# Patient Record
Sex: FEMALE | NOT HISPANIC OR LATINO
[De-identification: names, ages, dates, MRNs, and addresses within clinical notes are randomized per-mention and may not be internally consistent; named-entity substitution may affect disease eponyms.]

---

## 2017-10-02 ENCOUNTER — CHARTING TRANS (OUTPATIENT)
Dept: OTHER | Age: 47
End: 2017-10-02

## 2017-11-16 ENCOUNTER — MYAURORA ACCOUNT LINK (OUTPATIENT)
Dept: OTHER | Age: 47
End: 2017-11-16

## 2017-11-16 ENCOUNTER — CHARTING TRANS (OUTPATIENT)
Dept: FAMILY MEDICINE | Age: 47
End: 2017-11-16

## 2017-12-28 ENCOUNTER — LAB SERVICES (OUTPATIENT)
Dept: OTHER | Age: 47
End: 2017-12-28

## 2017-12-28 LAB
ALBUMIN SERPL BCG-MCNC: 4.5 G/DL (ref 3.6–5.1)
ALP SERPL-CCNC: 77 U/L (ref 45–105)
ALT SERPL W/O P-5'-P-CCNC: 28 U/L (ref 15–43)
AST SERPL-CCNC: 28 U/L (ref 14–43)
BASOPHIL %: 1.3 % (ref 0–1.2)
BASOPHIL ABSOLUTE #: 0.1 10*3/UL (ref 0–0.1)
BILIRUB SERPL-MCNC: 0.5 MG/DL (ref 0–1.3)
BUN SERPL-MCNC: 12 MG/DL (ref 7–20)
CALCIUM SERPL-MCNC: 9.9 MG/DL (ref 8.6–10.6)
CHLORIDE SERPL-SCNC: 100 MMOL/L (ref 96–107)
CHOLEST SERPL-MCNC: 270 MG/DL (ref 140–200)
CREATININE, SERUM: 0.8 MG/DL (ref 0.5–1.4)
DIFFERENTIAL TYPE: ABNORMAL
EOSINOPHIL %: 3.7 % (ref 0–10)
EOSINOPHIL ABSOLUTE #: 0.3 10*3/UL (ref 0–0.5)
GFR SERPL CREATININE-BSD FRML MDRD: >60 ML/MIN/{1.73M2}
GFR SERPL CREATININE-BSD FRML MDRD: >60 ML/MIN/{1.73M2}
GLUCOSE P FAST SERPL-MCNC: 98 MG/DL (ref 60–100)
HCO3 SERPL-SCNC: 28 MMOL/L (ref 22–32)
HDLC SERPL-MCNC: 70 MG/DL
HEMATOCRIT: 43.9 % (ref 34–45)
HEMOGLOBIN: 14.1 G/DL (ref 11.2–15.7)
IGA SERPL-MCNC: 251 MG/DL (ref 70–400)
LDLC SERPL DIRECT ASSAY-MCNC: 161 MG/DL (ref 30–100)
LYMPH PERCENT: 35.8 % (ref 20.5–51.1)
LYMPHOCYTE ABSOLUTE #: 2.7 10*3/UL (ref 1.2–3.4)
MEAN CORPUSCULAR HGB CONCENTRATION: 32.1 % (ref 32–36)
MEAN CORPUSCULAR HGB: 31.5 PG (ref 27–34)
MEAN CORPUSCULAR VOLUME: 98 FL (ref 79–95)
MEAN PLATELET VOLUME: 10.9 FL (ref 8.6–12.4)
MONOCYTE ABSOLUTE #: 0.5 10*3/UL (ref 0.2–0.9)
MONOCYTE PERCENT: 7.2 % (ref 4.3–12.9)
NEUTROPHIL ABSOLUTE #: 3.9 10*3/UL (ref 1.4–6.5)
NEUTROPHIL PERCENT: 52 % (ref 34–73.5)
PLATELET COUNT: 244 10*3/UL (ref 150–400)
POTASSIUM SERPL-SCNC: 4 MMOL/L (ref 3.5–5.3)
PROT SERPL-MCNC: 7.4 G/DL (ref 6.4–8.5)
RED BLOOD CELL COUNT: 4.48 10*6/UL (ref 3.7–5.2)
RED CELL DISTRIBUTION WIDTH: 12.2 % (ref 11.3–14.8)
SODIUM SERPL-SCNC: 138 MMOL/L (ref 136–146)
TRIGL SERPL-MCNC: 260 MG/DL (ref 0–200)
TSH SERPL DL<=0.05 MIU/L-ACNC: 2.41 M[IU]/L (ref 0.3–4.82)
WHITE BLOOD CELL COUNT: 7.5 10*3/UL (ref 4–10)

## 2017-12-29 LAB — TTG IGA SER IA-ACNC: 0.6 U/ML (ref 0–7)

## 2018-01-02 LAB — ENDOMYSIUM IGA TITR SER IF: NORMAL {TITER}

## 2018-11-27 VITALS
HEART RATE: 64 BPM | BODY MASS INDEX: 24.96 KG/M2 | SYSTOLIC BLOOD PRESSURE: 110 MMHG | HEIGHT: 67 IN | TEMPERATURE: 98.5 F | WEIGHT: 159 LBS | RESPIRATION RATE: 16 BRPM | DIASTOLIC BLOOD PRESSURE: 70 MMHG

## 2021-09-08 ENCOUNTER — LAB ENCOUNTER (OUTPATIENT)
Dept: LAB | Age: 51
End: 2021-09-08
Attending: FAMILY MEDICINE
Payer: COMMERCIAL

## 2021-09-08 ENCOUNTER — OFFICE VISIT (OUTPATIENT)
Dept: FAMILY MEDICINE CLINIC | Facility: CLINIC | Age: 51
End: 2021-09-08
Payer: COMMERCIAL

## 2021-09-08 VITALS
HEART RATE: 76 BPM | OXYGEN SATURATION: 98 % | SYSTOLIC BLOOD PRESSURE: 108 MMHG | WEIGHT: 148.5 LBS | RESPIRATION RATE: 18 BRPM | DIASTOLIC BLOOD PRESSURE: 68 MMHG | BODY MASS INDEX: 23.31 KG/M2 | HEIGHT: 67 IN | TEMPERATURE: 99 F

## 2021-09-08 DIAGNOSIS — Z00.00 GENERAL MEDICAL EXAM: ICD-10-CM

## 2021-09-08 DIAGNOSIS — Z83.3 FAMILY HISTORY OF DIABETES MELLITUS: ICD-10-CM

## 2021-09-08 DIAGNOSIS — J01.00 SUBACUTE MAXILLARY SINUSITIS: ICD-10-CM

## 2021-09-08 DIAGNOSIS — Z12.31 ENCOUNTER FOR SCREENING MAMMOGRAM FOR MALIGNANT NEOPLASM OF BREAST: ICD-10-CM

## 2021-09-08 DIAGNOSIS — J30.89 SEASONAL ALLERGIC RHINITIS DUE TO OTHER ALLERGIC TRIGGER: ICD-10-CM

## 2021-09-08 DIAGNOSIS — Z12.11 SCREEN FOR COLON CANCER: ICD-10-CM

## 2021-09-08 DIAGNOSIS — Z00.00 GENERAL MEDICAL EXAM: Primary | ICD-10-CM

## 2021-09-08 DIAGNOSIS — K20.0 EOSINOPHILIC ESOPHAGITIS: ICD-10-CM

## 2021-09-08 PROBLEM — J30.9 ALLERGIC RHINITIS DUE TO ALLERGEN: Status: ACTIVE | Noted: 2021-09-08

## 2021-09-08 PROBLEM — B97.7 HPV (HUMAN PAPILLOMA VIRUS) INFECTION: Status: ACTIVE | Noted: 2021-09-08

## 2021-09-08 LAB
ALBUMIN SERPL-MCNC: 3.7 G/DL (ref 3.4–5)
ALBUMIN/GLOB SERPL: 1.1 {RATIO} (ref 1–2)
ALP LIVER SERPL-CCNC: 66 U/L
ALT SERPL-CCNC: 23 U/L
ANION GAP SERPL CALC-SCNC: 3 MMOL/L (ref 0–18)
AST SERPL-CCNC: 23 U/L (ref 15–37)
BASOPHILS # BLD AUTO: 0.1 X10(3) UL (ref 0–0.2)
BASOPHILS NFR BLD AUTO: 1.7 %
BILIRUB SERPL-MCNC: 0.4 MG/DL (ref 0.1–2)
BUN BLD-MCNC: 15 MG/DL (ref 7–18)
CALCIUM BLD-MCNC: 9.4 MG/DL (ref 8.5–10.1)
CHLORIDE SERPL-SCNC: 108 MMOL/L (ref 98–112)
CHOLEST SMN-MCNC: 225 MG/DL (ref ?–200)
CO2 SERPL-SCNC: 29 MMOL/L (ref 21–32)
CREAT BLD-MCNC: 0.82 MG/DL
EOSINOPHIL # BLD AUTO: 0.1 X10(3) UL (ref 0–0.7)
EOSINOPHIL NFR BLD AUTO: 1.7 %
ERYTHROCYTE [DISTWIDTH] IN BLOOD BY AUTOMATED COUNT: 12 %
EST. AVERAGE GLUCOSE BLD GHB EST-MCNC: 114 MG/DL (ref 68–126)
GLOBULIN PLAS-MCNC: 3.5 G/DL (ref 2.8–4.4)
GLUCOSE BLD-MCNC: 93 MG/DL (ref 70–99)
HBA1C MFR BLD HPLC: 5.6 % (ref ?–5.7)
HCT VFR BLD AUTO: 43.4 %
HDLC SERPL-MCNC: 60 MG/DL (ref 40–59)
HGB BLD-MCNC: 13.7 G/DL
IMM GRANULOCYTES # BLD AUTO: 0.01 X10(3) UL (ref 0–1)
IMM GRANULOCYTES NFR BLD: 0.2 %
LDLC SERPL CALC-MCNC: 137 MG/DL (ref ?–100)
LYMPHOCYTES # BLD AUTO: 1.96 X10(3) UL (ref 1–4)
LYMPHOCYTES NFR BLD AUTO: 34 %
M PROTEIN MFR SERPL ELPH: 7.2 G/DL (ref 6.4–8.2)
MCH RBC QN AUTO: 30.9 PG (ref 26–34)
MCHC RBC AUTO-ENTMCNC: 31.6 G/DL (ref 31–37)
MCV RBC AUTO: 98 FL
MONOCYTES # BLD AUTO: 0.35 X10(3) UL (ref 0.1–1)
MONOCYTES NFR BLD AUTO: 6.1 %
NEUTROPHILS # BLD AUTO: 3.24 X10 (3) UL (ref 1.5–7.7)
NEUTROPHILS # BLD AUTO: 3.24 X10(3) UL (ref 1.5–7.7)
NEUTROPHILS NFR BLD AUTO: 56.3 %
NONHDLC SERPL-MCNC: 165 MG/DL (ref ?–130)
OSMOLALITY SERPL CALC.SUM OF ELEC: 291 MOSM/KG (ref 275–295)
PATIENT FASTING Y/N/NP: NO
PATIENT FASTING Y/N/NP: NO
PLATELET # BLD AUTO: 212 10(3)UL (ref 150–450)
POTASSIUM SERPL-SCNC: 4.7 MMOL/L (ref 3.5–5.1)
RBC # BLD AUTO: 4.43 X10(6)UL
SODIUM SERPL-SCNC: 140 MMOL/L (ref 136–145)
T4 FREE SERPL-MCNC: 0.8 NG/DL (ref 0.8–1.7)
TRIGL SERPL-MCNC: 158 MG/DL (ref 30–149)
TSI SER-ACNC: 2.06 MIU/ML (ref 0.36–3.74)
VLDLC SERPL CALC-MCNC: 29 MG/DL (ref 0–30)
WBC # BLD AUTO: 5.8 X10(3) UL (ref 4–11)

## 2021-09-08 PROCEDURE — 3078F DIAST BP <80 MM HG: CPT | Performed by: NURSE PRACTITIONER

## 2021-09-08 PROCEDURE — 99386 PREV VISIT NEW AGE 40-64: CPT | Performed by: NURSE PRACTITIONER

## 2021-09-08 PROCEDURE — 83036 HEMOGLOBIN GLYCOSYLATED A1C: CPT | Performed by: NURSE PRACTITIONER

## 2021-09-08 PROCEDURE — 80050 GENERAL HEALTH PANEL: CPT | Performed by: NURSE PRACTITIONER

## 2021-09-08 PROCEDURE — 84439 ASSAY OF FREE THYROXINE: CPT | Performed by: NURSE PRACTITIONER

## 2021-09-08 PROCEDURE — 3074F SYST BP LT 130 MM HG: CPT | Performed by: NURSE PRACTITIONER

## 2021-09-08 PROCEDURE — 3008F BODY MASS INDEX DOCD: CPT | Performed by: NURSE PRACTITIONER

## 2021-09-08 PROCEDURE — 80061 LIPID PANEL: CPT | Performed by: NURSE PRACTITIONER

## 2021-09-08 RX ORDER — AMOXICILLIN AND CLAVULANATE POTASSIUM 875; 125 MG/1; MG/1
1 TABLET, FILM COATED ORAL 2 TIMES DAILY
Qty: 20 TABLET | Refills: 0 | Status: SHIPPED | OUTPATIENT
Start: 2021-09-08 | End: 2021-09-18

## 2021-09-08 RX ORDER — NORGESTIMATE AND ETHINYL ESTRADIOL 0.25-0.035
KIT ORAL DAILY
COMMUNITY
End: 2021-11-22

## 2021-09-08 NOTE — PROGRESS NOTES
HPI: HPI   Patient is here to establish care and for regular check up. Feels she may have a sinus infection. Dealt with an infected tooth in the spring. Had to have root canal and there was a lot of pus drainage. On abx, most recently in June 2021.  Fee did elimination diet   Endocrine: Negative for cold intolerance, heat intolerance, polydipsia, polyphagia and polyuria. Thin hair   Genitourinary: Negative for dysuria, frequency, hematuria and pelvic pain. Musculoskeletal: Negative for back pain. Musculoskeletal:         General: Normal range of motion. Cervical back: Normal range of motion. Skin:     General: Skin is warm and dry. Neurological:      General: No focal deficit present.       Mental Status: She is alert and oriented to pers

## 2021-09-08 NOTE — PATIENT INSTRUCTIONS
What is the human papillomavirus (HPV) vaccine? The HPV vaccine helps keep people from getting infected with a germ called \"human papillomavirus,\" or \"HPV. \"    Vaccines can prevent certain serious or deadly infections.  They work by preparing the body age 6 or 15. But people can get the vaccine any time from age 5 to 32. People should not get the vaccine if they are pregnant. The HPV vaccine works best when it is given before a person gets infected with HPV.  The HPV vaccine can't cure an HPV infecti They also look for cells that could turn into cancer, called \"precancer. \"    Getting the HPV vaccine lowers your chances of getting cervical cancer. But it does not completely protect you. You should still be screened for cancer or precancer.

## 2021-09-17 ENCOUNTER — TELEPHONE (OUTPATIENT)
Dept: FAMILY MEDICINE CLINIC | Facility: CLINIC | Age: 51
End: 2021-09-17

## 2021-09-17 NOTE — TELEPHONE ENCOUNTER
Patient was seen in the respiratory clinic on 9/8/21. She states her sinus symptoms have sense resolved. She was wanting to know if she should still take the antibiotic.  Advised against taking the medication if her symptoms have resolved and she verbalized

## 2021-09-17 NOTE — TELEPHONE ENCOUNTER
Blane Jozef is calling because she waited to take the antibiotic due to issues with her tooth but now some of the issues that she had been having have gone away so she is wondering if she should take them.   Please call  Please leave a message if she doesn't answe

## 2021-10-12 ENCOUNTER — HOSPITAL ENCOUNTER (OUTPATIENT)
Dept: MAMMOGRAPHY | Age: 51
Discharge: HOME | End: 2021-10-12
Attending: NURSE PRACTITIONER
Payer: COMMERCIAL

## 2021-10-12 DIAGNOSIS — Z12.31 ENCOUNTER FOR SCREENING MAMMOGRAM FOR MALIGNANT NEOPLASM OF BREAST: ICD-10-CM

## 2021-10-12 PROCEDURE — 77067 SCR MAMMO BI INCL CAD: CPT | Performed by: NURSE PRACTITIONER

## 2021-11-22 ENCOUNTER — TELEPHONE (OUTPATIENT)
Dept: FAMILY MEDICINE CLINIC | Facility: CLINIC | Age: 51
End: 2021-11-22

## 2021-11-22 RX ORDER — AMOXICILLIN AND CLAVULANATE POTASSIUM 875; 125 MG/1; MG/1
1 TABLET, FILM COATED ORAL EVERY 12 HOURS
COMMUNITY
Start: 2021-11-12 | End: 2022-01-25 | Stop reason: ALTCHOICE

## 2021-11-22 RX ORDER — FLUCONAZOLE 150 MG/1
150 TABLET ORAL ONCE
Qty: 2 TABLET | Refills: 0 | Status: SHIPPED | OUTPATIENT
Start: 2021-11-22 | End: 2021-11-22

## 2021-11-22 RX ORDER — NORGESTIMATE AND ETHINYL ESTRADIOL
1 KIT DAILY
COMMUNITY
Start: 2021-11-12

## 2021-11-22 NOTE — TELEPHONE ENCOUNTER
HAS YEAST INF FROM [facility] PRESCRIBED BY DENTIST, CAN [facility] BE SENT TO [facility] FOR HER?  THIS SEEMS TO WORK BEST FOR HER YEAST INF, LEAVE DETAILED VOICEMAIL IF SHE DOES NOT ANSWER

## 2021-11-22 NOTE — TELEPHONE ENCOUNTER
Pt has never seen Dr. Joselyn Goodell. Has only seen Keli once on 9/8/21 (and at Dr. Jadon [facility])    States she was prescribed Augmentin 875mg BID on 11/12/21 by her dentist, and has 3 days left. C/o vaginal itching since yesterday. No discharge yet.  States she

## 2021-12-14 ENCOUNTER — TELEPHONE (OUTPATIENT)
Dept: GENERAL RADIOLOGY | Facility: HOSPITAL | Age: 51
End: 2021-12-14

## 2021-12-14 ENCOUNTER — HOSPITAL ENCOUNTER (OUTPATIENT)
Dept: MAMMOGRAPHY | Age: 51
Discharge: HOME | End: 2021-12-14
Attending: NURSE PRACTITIONER
Payer: COMMERCIAL

## 2021-12-14 DIAGNOSIS — R92.2 INCONCLUSIVE MAMMOGRAM: ICD-10-CM

## 2021-12-14 PROCEDURE — 77061 BREAST TOMOSYNTHESIS UNI: CPT | Performed by: NURSE PRACTITIONER

## 2021-12-14 PROCEDURE — 77065 DX MAMMO INCL CAD UNI: CPT | Performed by: NURSE PRACTITIONER

## 2021-12-15 NOTE — IMAGING NOTE
Carrie Zepeda is recommended for a stereotactic biopsy of the right breast by . 1416: Spoke with Ms. Ruvalcaba at this time.      History: Inconclusive mammogram     FINDINGS: Rommie Ruff are grouped microcalcifications in the right breast slightly

## 2022-01-04 ENCOUNTER — TELEPHONE (OUTPATIENT)
Dept: FAMILY MEDICINE CLINIC | Facility: CLINIC | Age: 52
End: 2022-01-04

## 2022-01-24 ENCOUNTER — TELEPHONE (OUTPATIENT)
Dept: FAMILY MEDICINE CLINIC | Facility: CLINIC | Age: 52
End: 2022-01-24

## 2022-01-24 NOTE — TELEPHONE ENCOUNTER
Spoke with pt. States she thinks she has shingles. Reports she didn't feel well in general on Thursday and Friday. On Saturday, she had pain and itching on her L ribcage, \"like fireants\".  She then developed small patch of bumps in the front of ribs and

## 2022-01-24 NOTE — TELEPHONE ENCOUNTER
Please advise patient that she may want to schedule an office visit to determine whether these are shingles or some other reason for the rash as I have never seen her as a patient.   It should be okay to proceed with the breast biopsy as the area of concern

## 2022-01-24 NOTE — TELEPHONE ENCOUNTER
SHE THINKS THAT SHE HAS SHINGLES BUT IS SCHEDULED FOR A PROCEDURE THIS Wednesday. CAN SHE STILL HAVE THAT DONE?

## 2022-01-25 ENCOUNTER — OFFICE VISIT (OUTPATIENT)
Dept: FAMILY MEDICINE CLINIC | Facility: CLINIC | Age: 52
End: 2022-01-25
Payer: COMMERCIAL

## 2022-01-25 VITALS
WEIGHT: 150 LBS | HEART RATE: 103 BPM | SYSTOLIC BLOOD PRESSURE: 134 MMHG | BODY MASS INDEX: 23.54 KG/M2 | RESPIRATION RATE: 16 BRPM | HEIGHT: 67 IN | DIASTOLIC BLOOD PRESSURE: 70 MMHG | TEMPERATURE: 99 F | OXYGEN SATURATION: 98 %

## 2022-01-25 DIAGNOSIS — B02.9 HERPES ZOSTER WITHOUT COMPLICATION: Primary | ICD-10-CM

## 2022-01-25 DIAGNOSIS — R92.0 MICROCALCIFICATION OF RIGHT BREAST ON MAMMOGRAPHY: ICD-10-CM

## 2022-01-25 PROCEDURE — 3008F BODY MASS INDEX DOCD: CPT | Performed by: FAMILY MEDICINE

## 2022-01-25 PROCEDURE — 3075F SYST BP GE 130 - 139MM HG: CPT | Performed by: FAMILY MEDICINE

## 2022-01-25 PROCEDURE — 3078F DIAST BP <80 MM HG: CPT | Performed by: FAMILY MEDICINE

## 2022-01-25 PROCEDURE — 99213 OFFICE O/P EST LOW 20 MIN: CPT | Performed by: FAMILY MEDICINE

## 2022-01-25 RX ORDER — VALACYCLOVIR HYDROCHLORIDE 1 G/1
1000 TABLET, FILM COATED ORAL 3 TIMES DAILY
Qty: 21 TABLET | Refills: 0 | Status: SHIPPED | OUTPATIENT
Start: 2022-01-25 | End: 2022-02-01

## 2022-01-25 NOTE — IMAGING NOTE
Patient called to reschedule her breast biopsy stating that she has been diagnosed with shingles on her back and rib area.  Radiologist was willing to do biopsy as long as the shingles was not on her breast but patient decided to reschedule to 2-10-22 since

## 2022-01-25 NOTE — PATIENT INSTRUCTIONS
DISCUSSED ETIOLOGY, EXPECTED COURSE, DURATION, RESOLUTION AND MANAGEMENT OPTIONS OF SHINGLES. DISCUSSED POTENTIAL FOR POST HERPETIC NEURALGIA IN SUSCEPTIBLE PATIENTS. DISCUSSED INFECTION CONTROL AND TRANSMISSION OF VARICELLA.   Start Valtrex 1000 mg TID x

## 2022-01-25 NOTE — PROGRESS NOTES
Kelsey Amos is a 47year old female. Patient presents with:  Rash: left side under bra line x 4 days, very painful      HPI:   Rash started 1/22, felt run down, pain/tingling prior to rash, less pain today, no meds taken  Pt states she is not feeling go murmur, peripheral pulses intact  PSYCH: normal affect    ASSESSMENT AND PLAN:     Herpes zoster without complication  (primary encounter diagnosis)  Microcalcification of right breast on mammography  No orders of the defined types were placed in this enco

## 2022-01-26 ENCOUNTER — HOSPITAL ENCOUNTER (OUTPATIENT)
Dept: MAMMOGRAPHY | Facility: HOSPITAL | Age: 52
Discharge: HOME | End: 2022-01-26
Attending: NURSE PRACTITIONER
Payer: COMMERCIAL

## 2022-02-10 ENCOUNTER — HOSPITAL ENCOUNTER (OUTPATIENT)
Dept: MAMMOGRAPHY | Facility: HOSPITAL | Age: 52
Discharge: HOME | End: 2022-02-10
Attending: NURSE PRACTITIONER
Payer: COMMERCIAL

## 2022-02-10 DIAGNOSIS — R92.1 BREAST CALCIFICATIONS: ICD-10-CM

## 2022-02-10 PROCEDURE — 19081 BX BREAST 1ST LESION STRTCTC: CPT | Performed by: NURSE PRACTITIONER

## 2022-02-10 PROCEDURE — 88305 TISSUE EXAM BY PATHOLOGIST: CPT | Performed by: NURSE PRACTITIONER

## 2022-02-15 ENCOUNTER — TELEPHONE (OUTPATIENT)
Dept: MAMMOGRAPHY | Facility: HOSPITAL | Age: 52
End: 2022-02-15

## 2022-02-15 NOTE — TELEPHONE ENCOUNTER
Telephoned Alisia Rajan and name,  verified with patient. Notified Alisia Rajan of right breast negative for malignancy biopsy result. Concordance verified by radiologist, Dr. Carolann Martin. Alisia Cobia reports biopsy site is healing well. Radiologist recommends  Annual screening mammogram.  Pt verbalized understanding and had no further questions at this time.

## 2022-02-21 ENCOUNTER — TELEPHONE (OUTPATIENT)
Dept: FAMILY MEDICINE CLINIC | Facility: CLINIC | Age: 52
End: 2022-02-21

## 2022-02-21 NOTE — TELEPHONE ENCOUNTER
Pt had shingles on 1/25/22. She asks how long she needs to wait before she can get a shingles vaccine?

## 2022-02-25 ENCOUNTER — TELEPHONE (OUTPATIENT)
Dept: FAMILY MEDICINE CLINIC | Facility: CLINIC | Age: 52
End: 2022-02-25

## 2022-02-25 NOTE — TELEPHONE ENCOUNTER
[address] [place] [address] request for pathology report from breast biopsy on 2/10/22. Faxed on 2/25/22.

## 2022-03-30 ENCOUNTER — OFFICE VISIT (OUTPATIENT)
Dept: FAMILY MEDICINE CLINIC | Facility: CLINIC | Age: 52
End: 2022-03-30
Payer: COMMERCIAL

## 2022-03-30 VITALS
SYSTOLIC BLOOD PRESSURE: 104 MMHG | HEART RATE: 98 BPM | BODY MASS INDEX: 23 KG/M2 | DIASTOLIC BLOOD PRESSURE: 70 MMHG | TEMPERATURE: 99 F | OXYGEN SATURATION: 99 % | WEIGHT: 146 LBS

## 2022-03-30 DIAGNOSIS — Z23 NEED FOR VACCINATION: ICD-10-CM

## 2022-03-30 DIAGNOSIS — H69.83 DYSFUNCTION OF BOTH EUSTACHIAN TUBES: ICD-10-CM

## 2022-03-30 DIAGNOSIS — J01.41 ACUTE RECURRENT PANSINUSITIS: Primary | ICD-10-CM

## 2022-03-30 PROCEDURE — 3078F DIAST BP <80 MM HG: CPT | Performed by: FAMILY MEDICINE

## 2022-03-30 PROCEDURE — 99213 OFFICE O/P EST LOW 20 MIN: CPT | Performed by: FAMILY MEDICINE

## 2022-03-30 PROCEDURE — 3074F SYST BP LT 130 MM HG: CPT | Performed by: FAMILY MEDICINE

## 2022-03-30 RX ORDER — FLUCONAZOLE 150 MG/1
150 TABLET ORAL ONCE
Qty: 3 TABLET | Refills: 0 | Status: SHIPPED | OUTPATIENT
Start: 2022-03-30 | End: 2022-03-30

## 2022-03-30 RX ORDER — AMOXICILLIN AND CLAVULANATE POTASSIUM 875; 125 MG/1; MG/1
1 TABLET, FILM COATED ORAL 2 TIMES DAILY
Qty: 20 TABLET | Refills: 0 | Status: SHIPPED | OUTPATIENT
Start: 2022-03-30 | End: 2022-04-09

## 2022-03-30 NOTE — PATIENT INSTRUCTIONS
Continue nasal saline ad shahid. , proper intranasal administration reviewed  Add Flonase or Nasacort 2 sprays per nostril daily  Bacitracin antibiotic ointment to the nasal septum 2-3 times daily for the next 2 weeks  Augmentin 875 mg twice daily x10 days taken with food, potential GI side effects discussed  Patient may use fluconazole 150 mg daily at earliest onset of a vaginal yeast infection.

## 2022-05-20 ENCOUNTER — LAB ENCOUNTER (OUTPATIENT)
Dept: LAB | Age: 52
End: 2022-05-20
Attending: FAMILY MEDICINE
Payer: COMMERCIAL

## 2022-05-20 ENCOUNTER — HOSPITAL ENCOUNTER (OUTPATIENT)
Dept: GENERAL RADIOLOGY | Age: 52
Discharge: HOME | End: 2022-05-20
Attending: NURSE PRACTITIONER
Payer: COMMERCIAL

## 2022-05-20 ENCOUNTER — OFFICE VISIT (OUTPATIENT)
Dept: FAMILY MEDICINE CLINIC | Facility: CLINIC | Age: 52
End: 2022-05-20
Payer: COMMERCIAL

## 2022-05-20 VITALS
HEART RATE: 86 BPM | OXYGEN SATURATION: 100 % | TEMPERATURE: 99 F | BODY MASS INDEX: 22.29 KG/M2 | HEIGHT: 67 IN | SYSTOLIC BLOOD PRESSURE: 102 MMHG | RESPIRATION RATE: 18 BRPM | DIASTOLIC BLOOD PRESSURE: 72 MMHG | WEIGHT: 142 LBS

## 2022-05-20 DIAGNOSIS — Z83.3 FAMILY HISTORY OF DIABETES MELLITUS: ICD-10-CM

## 2022-05-20 DIAGNOSIS — E78.00 ELEVATED CHOLESTEROL: ICD-10-CM

## 2022-05-20 DIAGNOSIS — M47.22 OSTEOARTHRITIS OF SPINE WITH RADICULOPATHY, CERVICAL REGION: ICD-10-CM

## 2022-05-20 DIAGNOSIS — R20.0 NUMBNESS AND TINGLING OF RIGHT HAND: ICD-10-CM

## 2022-05-20 DIAGNOSIS — R20.2 NUMBNESS AND TINGLING OF RIGHT HAND: ICD-10-CM

## 2022-05-20 DIAGNOSIS — R53.83 OTHER FATIGUE: ICD-10-CM

## 2022-05-20 DIAGNOSIS — S00.06XA TICK BITE OF SCALP, INITIAL ENCOUNTER: ICD-10-CM

## 2022-05-20 DIAGNOSIS — M54.2 NECK PAIN: ICD-10-CM
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
## 2022-05-20 DIAGNOSIS — M25.60 JOINT STIFFNESS: Primary | ICD-10-CM

## 2022-05-20 DIAGNOSIS — M25.60 JOINT STIFFNESS: ICD-10-CM

## 2022-05-20 DIAGNOSIS — R52 BODY ACHES: ICD-10-CM

## 2022-05-20 DIAGNOSIS — R20.0 NUMBNESS AND TINGLING OF RIGHT HAND: Primary | ICD-10-CM

## 2022-05-20 DIAGNOSIS — R20.2 NUMBNESS AND TINGLING OF RIGHT HAND: Primary | ICD-10-CM

## 2022-05-20 LAB
BASOPHILS # BLD AUTO: 0.12 X10(3) UL (ref 0–0.2)
BASOPHILS NFR BLD AUTO: 1.8 %
CHOLEST SERPL-MCNC: 251 MG/DL (ref ?–200)
CRP SERPL-MCNC: <0.29 MG/DL (ref ?–0.3)
EOSINOPHIL # BLD AUTO: 1.02 X10(3) UL (ref 0–0.7)
EOSINOPHIL NFR BLD AUTO: 15.6 %
ERYTHROCYTE [DISTWIDTH] IN BLOOD BY AUTOMATED COUNT: 12.1 %
ERYTHROCYTE [SEDIMENTATION RATE] IN BLOOD: 8 MM/HR
EST. AVERAGE GLUCOSE BLD GHB EST-MCNC: 114 MG/DL (ref 68–126)
HBA1C MFR BLD: 5.6 % (ref ?–5.7)
HCT VFR BLD AUTO: 43.1 %
HDLC SERPL-MCNC: 70 MG/DL (ref 40–59)
HGB BLD-MCNC: 13.9 G/DL
IMM GRANULOCYTES # BLD AUTO: 0.01 X10(3) UL (ref 0–1)
IMM GRANULOCYTES NFR BLD: 0.2 %
LDLC SERPL CALC-MCNC: 157 MG/DL (ref ?–100)
LYMPHOCYTES # BLD AUTO: 2.9 X10(3) UL (ref 1–4)
LYMPHOCYTES NFR BLD AUTO: 44.3 %
MCH RBC QN AUTO: 31.5 PG (ref 26–34)
MCHC RBC AUTO-ENTMCNC: 32.3 G/DL (ref 31–37)
MCV RBC AUTO: 97.7 FL
MONOCYTES # BLD AUTO: 0.43 X10(3) UL (ref 0.1–1)
MONOCYTES NFR BLD AUTO: 6.6 %
NEUTROPHILS # BLD AUTO: 2.06 X10 (3) UL (ref 1.5–7.7)
NEUTROPHILS # BLD AUTO: 2.06 X10(3) UL (ref 1.5–7.7)
NEUTROPHILS NFR BLD AUTO: 31.5 %
NONHDLC SERPL-MCNC: 181 MG/DL (ref ?–130)
PLATELET # BLD AUTO: 232 10(3)UL (ref 150–450)
RBC # BLD AUTO: 4.41 X10(6)UL
RHEUMATOID FACT SERPL-ACNC: <10 IU/ML (ref ?–15)
T4 FREE SERPL-MCNC: 0.8 NG/DL (ref 0.8–1.7)
TRIGL SERPL-MCNC: 134 MG/DL (ref 30–149)
TSI SER-ACNC: 2.32 MIU/ML (ref 0.36–3.74)
VIT B12 SERPL-MCNC: 645 PG/ML (ref 193–986)
VIT D+METAB SERPL-MCNC: 35 NG/ML (ref 30–100)
VLDLC SERPL CALC-MCNC: 26 MG/DL (ref 0–30)
WBC # BLD AUTO: 6.5 X10(3) UL (ref 4–11)

## 2022-05-20 PROCEDURE — 86140 C-REACTIVE PROTEIN: CPT | Performed by: NURSE PRACTITIONER

## 2022-05-20 PROCEDURE — 87476 LYME DIS DNA AMP PROBE: CPT | Performed by: NURSE PRACTITIONER

## 2022-05-20 PROCEDURE — 99214 OFFICE O/P EST MOD 30 MIN: CPT | Performed by: NURSE PRACTITIONER

## 2022-05-20 PROCEDURE — 72050 X-RAY EXAM NECK SPINE 4/5VWS: CPT | Performed by: NURSE PRACTITIONER

## 2022-05-20 PROCEDURE — 86431 RHEUMATOID FACTOR QUANT: CPT | Performed by: NURSE PRACTITIONER

## 2022-05-20 PROCEDURE — 84439 ASSAY OF FREE THYROXINE: CPT | Performed by: NURSE PRACTITIONER

## 2022-05-20 PROCEDURE — 3074F SYST BP LT 130 MM HG: CPT | Performed by: NURSE PRACTITIONER

## 2022-05-20 PROCEDURE — 82306 VITAMIN D 25 HYDROXY: CPT | Performed by: NURSE PRACTITIONER

## 2022-05-20 PROCEDURE — 82607 VITAMIN B-12: CPT | Performed by: NURSE PRACTITIONER

## 2022-05-20 PROCEDURE — 85025 COMPLETE CBC W/AUTO DIFF WBC: CPT | Performed by: NURSE PRACTITIONER

## 2022-05-20 PROCEDURE — 85652 RBC SED RATE AUTOMATED: CPT | Performed by: NURSE PRACTITIONER

## 2022-05-20 PROCEDURE — 3008F BODY MASS INDEX DOCD: CPT | Performed by: NURSE PRACTITIONER

## 2022-05-20 PROCEDURE — 86038 ANTINUCLEAR ANTIBODIES: CPT | Performed by: NURSE PRACTITIONER

## 2022-05-20 PROCEDURE — 83036 HEMOGLOBIN GLYCOSYLATED A1C: CPT | Performed by: NURSE PRACTITIONER

## 2022-05-20 PROCEDURE — 80061 LIPID PANEL: CPT | Performed by: NURSE PRACTITIONER

## 2022-05-20 PROCEDURE — 84443 ASSAY THYROID STIM HORMONE: CPT | Performed by: NURSE PRACTITIONER

## 2022-05-20 PROCEDURE — 3078F DIAST BP <80 MM HG: CPT | Performed by: NURSE PRACTITIONER

## 2022-05-23 LAB — ANA SER QL: NEGATIVE

## 2022-05-25 DIAGNOSIS — E78.00 ELEVATED CHOLESTEROL: Primary | ICD-10-CM

## 2022-05-25 DIAGNOSIS — Z23 NEED FOR SHINGLES VACCINE: ICD-10-CM

## 2022-05-26 DIAGNOSIS — M54.2 NECK PAIN: ICD-10-CM

## 2022-05-26 DIAGNOSIS — R20.2 NUMBNESS AND TINGLING OF RIGHT HAND: Primary | ICD-10-CM

## 2022-05-26 DIAGNOSIS — R20.0 NUMBNESS AND TINGLING OF RIGHT HAND: Primary | ICD-10-CM

## 2022-05-26 LAB — BORRELIA SPECIES DNA DETECTION: NOT DETECTED

## 2022-05-26 RX ORDER — CYCLOBENZAPRINE HCL 10 MG
10 TABLET ORAL 3 TIMES DAILY PRN
Qty: 20 TABLET | Refills: 0 | Status: SHIPPED | OUTPATIENT
Start: 2022-05-26

## 2022-05-26 RX ORDER — PREDNISONE 20 MG/1
20 TABLET ORAL 2 TIMES DAILY
Qty: 10 TABLET | Refills: 0 | Status: SHIPPED | OUTPATIENT
Start: 2022-05-26 | End: 2022-05-31

## 2022-06-01 DIAGNOSIS — R20.2 NUMBNESS AND TINGLING OF HAND: Primary | ICD-10-CM

## 2022-06-01 DIAGNOSIS — R20.0 NUMBNESS AND TINGLING OF HAND: Primary | ICD-10-CM

## 2022-06-10 ENCOUNTER — TELEPHONE (OUTPATIENT)
Dept: PHYSICAL THERAPY | Facility: HOSPITAL | Age: 52
End: 2022-06-10

## 2022-06-13 ENCOUNTER — OFFICE VISIT (OUTPATIENT)
Dept: PHYSICAL THERAPY | Age: 52
End: 2022-06-13
Attending: NURSE PRACTITIONER
Payer: COMMERCIAL

## 2022-06-13 DIAGNOSIS — R20.0 NUMBNESS AND TINGLING OF RIGHT HAND: ICD-10-CM

## 2022-06-13 DIAGNOSIS — R20.2 NUMBNESS AND TINGLING OF RIGHT HAND: ICD-10-CM

## 2022-06-13 DIAGNOSIS — M47.22 OSTEOARTHRITIS OF SPINE WITH RADICULOPATHY, CERVICAL REGION: ICD-10-CM

## 2022-06-13 PROCEDURE — 97110 THERAPEUTIC EXERCISES: CPT

## 2022-06-13 PROCEDURE — 97162 PT EVAL MOD COMPLEX 30 MIN: CPT

## 2022-06-16 ENCOUNTER — OFFICE VISIT (OUTPATIENT)
Dept: PHYSICAL THERAPY | Age: 52
End: 2022-06-16
Attending: NURSE PRACTITIONER
Payer: COMMERCIAL

## 2022-06-16 PROCEDURE — 97140 MANUAL THERAPY 1/> REGIONS: CPT

## 2022-06-16 PROCEDURE — 97110 THERAPEUTIC EXERCISES: CPT

## 2022-06-20 ENCOUNTER — OFFICE VISIT (OUTPATIENT)
Dept: PHYSICAL THERAPY | Age: 52
End: 2022-06-20
Attending: NURSE PRACTITIONER
Payer: COMMERCIAL

## 2022-06-20 PROCEDURE — 97110 THERAPEUTIC EXERCISES: CPT

## 2022-06-20 PROCEDURE — 97140 MANUAL THERAPY 1/> REGIONS: CPT

## 2022-06-23 ENCOUNTER — OFFICE VISIT (OUTPATIENT)
Dept: PHYSICAL THERAPY | Age: 52
End: 2022-06-23
Attending: NURSE PRACTITIONER
Payer: COMMERCIAL

## 2022-06-23 PROCEDURE — 97110 THERAPEUTIC EXERCISES: CPT

## 2022-06-23 PROCEDURE — 97140 MANUAL THERAPY 1/> REGIONS: CPT

## 2022-06-27 ENCOUNTER — OFFICE VISIT (OUTPATIENT)
Dept: PHYSICAL THERAPY | Age: 52
End: 2022-06-27
Attending: NURSE PRACTITIONER
Payer: COMMERCIAL

## 2022-06-27 PROCEDURE — 97110 THERAPEUTIC EXERCISES: CPT

## 2022-06-27 PROCEDURE — 97140 MANUAL THERAPY 1/> REGIONS: CPT

## 2022-06-30 ENCOUNTER — OFFICE VISIT (OUTPATIENT)
Dept: PHYSICAL THERAPY | Age: 52
End: 2022-06-30
Attending: NURSE PRACTITIONER
Payer: COMMERCIAL

## 2022-06-30 PROCEDURE — 97110 THERAPEUTIC EXERCISES: CPT

## 2022-06-30 PROCEDURE — 97140 MANUAL THERAPY 1/> REGIONS: CPT

## 2022-07-07 ENCOUNTER — TELEPHONE (OUTPATIENT)
Dept: PHYSICAL THERAPY | Facility: HOSPITAL | Age: 52
End: 2022-07-07

## 2022-07-07 ENCOUNTER — TELEPHONE (OUTPATIENT)
Dept: FAMILY MEDICINE CLINIC | Facility: CLINIC | Age: 52
End: 2022-07-07

## 2022-07-07 ENCOUNTER — NURSE ONLY (OUTPATIENT)
Dept: FAMILY MEDICINE CLINIC | Facility: CLINIC | Age: 52
End: 2022-07-07
Payer: COMMERCIAL

## 2022-07-07 ENCOUNTER — APPOINTMENT (OUTPATIENT)
Dept: PHYSICAL THERAPY | Age: 52
End: 2022-07-07
Attending: NURSE PRACTITIONER
Payer: COMMERCIAL

## 2022-07-07 DIAGNOSIS — R30.0 DYSURIA: Primary | ICD-10-CM

## 2022-07-07 LAB
APPEARANCE: CLEAR
BILIRUBIN: NEGATIVE
GLUCOSE (URINE DIPSTICK): NEGATIVE MG/DL
KETONES (URINE DIPSTICK): NEGATIVE MG/DL
MULTISTIX LOT#: ABNORMAL NUMERIC
NITRITE, URINE: NEGATIVE
PH, URINE: 6 (ref 4.5–8)
PROTEIN (URINE DIPSTICK): NEGATIVE MG/DL
SPECIFIC GRAVITY: <=1.005 (ref 1–1.03)
URINE-COLOR: YELLOW
UROBILINOGEN,SEMI-QN: 0.2 MG/DL (ref 0–1.9)

## 2022-07-07 PROCEDURE — 81003 URINALYSIS AUTO W/O SCOPE: CPT | Performed by: FAMILY MEDICINE

## 2022-07-07 PROCEDURE — 87086 URINE CULTURE/COLONY COUNT: CPT | Performed by: FAMILY MEDICINE

## 2022-07-07 RX ORDER — NITROFURANTOIN 25; 75 MG/1; MG/1
100 CAPSULE ORAL 2 TIMES DAILY
Qty: 10 CAPSULE | Refills: 0 | Status: SHIPPED | OUTPATIENT
Start: 2022-07-07 | End: 2022-07-12

## 2022-07-07 RX ORDER — PHENAZOPYRIDINE HYDROCHLORIDE 200 MG/1
200 TABLET, FILM COATED ORAL 3 TIMES DAILY PRN
Qty: 9 TABLET | Refills: 0 | Status: SHIPPED | OUTPATIENT
Start: 2022-07-07 | End: 2022-07-10

## 2022-07-11 ENCOUNTER — OFFICE VISIT (OUTPATIENT)
Dept: PHYSICAL THERAPY | Age: 52
End: 2022-07-11
Attending: NURSE PRACTITIONER
Payer: COMMERCIAL

## 2022-07-11 DIAGNOSIS — R31.9 HEMATURIA, UNSPECIFIED TYPE: Primary | ICD-10-CM

## 2022-07-11 PROCEDURE — 97110 THERAPEUTIC EXERCISES: CPT

## 2022-07-11 PROCEDURE — 97140 MANUAL THERAPY 1/> REGIONS: CPT

## 2022-07-12 ENCOUNTER — TELEPHONE (OUTPATIENT)
Dept: FAMILY MEDICINE CLINIC | Facility: CLINIC | Age: 52
End: 2022-07-12

## 2022-07-12 NOTE — TELEPHONE ENCOUNTER
It appears you ordered a repeat UA to check resolution of blood. *Do you want a UA dip or a send-out?

## 2022-07-13 ENCOUNTER — NURSE ONLY (OUTPATIENT)
Dept: FAMILY MEDICINE CLINIC | Facility: CLINIC | Age: 52
End: 2022-07-13
Payer: COMMERCIAL

## 2022-07-13 LAB
APPEARANCE: CLEAR
BILIRUBIN: NEGATIVE
GLUCOSE (URINE DIPSTICK): NEGATIVE MG/DL
KETONES (URINE DIPSTICK): NEGATIVE MG/DL
LEUKOCYTES: NEGATIVE
MULTISTIX LOT#: NORMAL NUMERIC
NITRITE, URINE: NEGATIVE
OCCULT BLOOD: NEGATIVE
PH, URINE: 7 (ref 4.5–8)
PROTEIN (URINE DIPSTICK): NEGATIVE MG/DL
SPECIFIC GRAVITY: 1.01 (ref 1–1.03)
URINE-COLOR: YELLOW
UROBILINOGEN,SEMI-QN: 0.2 MG/DL (ref 0–1.9)

## 2022-09-27 ENCOUNTER — TELEPHONE (OUTPATIENT)
Dept: FAMILY MEDICINE CLINIC | Facility: CLINIC | Age: 52
End: 2022-09-27

## 2022-09-27 NOTE — TELEPHONE ENCOUNTER
got a rare infection and she is wanting to know if this is something she can be tested from. Blastomycosis? (not sure I spelled this correct) She is very worried. I did schedule her for Thurs. Because she has other concerns as well. But she is still wanting to know if she can be tested for this when she comes in for this appt. She said you can leave a detailed message on her machine if needed.

## 2022-10-13 ENCOUNTER — TELEPHONE (OUTPATIENT)
Dept: FAMILY MEDICINE CLINIC | Facility: CLINIC | Age: 52
End: 2022-10-13

## 2023-01-26 ENCOUNTER — OFFICE VISIT (OUTPATIENT)
Dept: FAMILY MEDICINE CLINIC | Facility: CLINIC | Age: 53
End: 2023-01-26
Payer: COMMERCIAL

## 2023-01-26 VITALS
SYSTOLIC BLOOD PRESSURE: 90 MMHG | WEIGHT: 135.13 LBS | HEART RATE: 94 BPM | BODY MASS INDEX: 21 KG/M2 | TEMPERATURE: 99 F | OXYGEN SATURATION: 99 % | DIASTOLIC BLOOD PRESSURE: 60 MMHG

## 2023-01-26 DIAGNOSIS — J32.9 RECURRENT SINUSITIS: Primary | ICD-10-CM

## 2023-01-26 DIAGNOSIS — F43.20 ADULT SITUATIONAL STRESS DISORDER: ICD-10-CM

## 2023-01-26 DIAGNOSIS — M62.89 MUSCLE TIGHTNESS: ICD-10-CM

## 2023-01-26 PROCEDURE — 3078F DIAST BP <80 MM HG: CPT | Performed by: FAMILY MEDICINE

## 2023-01-26 PROCEDURE — 99214 OFFICE O/P EST MOD 30 MIN: CPT | Performed by: FAMILY MEDICINE

## 2023-01-26 PROCEDURE — 3074F SYST BP LT 130 MM HG: CPT | Performed by: FAMILY MEDICINE

## 2023-01-26 RX ORDER — AMOXICILLIN AND CLAVULANATE POTASSIUM 875; 125 MG/1; MG/1
1 TABLET, FILM COATED ORAL 2 TIMES DAILY
Qty: 28 TABLET | Refills: 0 | Status: SHIPPED | OUTPATIENT
Start: 2023-01-26 | End: 2023-02-09

## 2023-01-26 NOTE — PATIENT INSTRUCTIONS
I advised patient that I would recommend aggressively treating her sinusitis with Augmentin 875 mg twice daily x14 days as well as using nasal saline 2 or 3 times per day and Flonase 2 sprays per nostril daily, proper intranasal administration reviewed. Would recommend checking a CT scan of sinuses at least 1 week after completion of therapy, further recommendations pending results of CT  I discussed anxiety as a likely cause of much of her muscle tightness, insomnia etc.  Discussed proper stretching technique for cervical, upper back and upper extremities.   Patient would benefit from massage therapy may use magnesium nightly

## 2023-02-27 ENCOUNTER — TELEPHONE (OUTPATIENT)
Dept: FAMILY MEDICINE CLINIC | Facility: CLINIC | Age: 53
End: 2023-02-27

## 2023-02-27 NOTE — TELEPHONE ENCOUNTER
Pt went to get her CT done. She was advised that her insurance didn't prove it. She wanted to discuss.

## 2023-03-02 NOTE — TELEPHONE ENCOUNTER
Spoke with pt on 2/27. The referral that was placed was still pending. Message sent to Pietro in referral dept. He was going to look into it and contact pt when approved.     Referral now approved and pt is scheduled for CT on 3/4

## 2023-03-04 ENCOUNTER — HOSPITAL ENCOUNTER (OUTPATIENT)
Dept: CT IMAGING | Age: 53
Discharge: HOME | End: 2023-03-04
Attending: FAMILY MEDICINE
Payer: COMMERCIAL

## 2023-03-04 DIAGNOSIS — J32.9 RECURRENT SINUSITIS: ICD-10-CM

## 2023-03-04 PROCEDURE — 70486 CT MAXILLOFACIAL W/O DYE: CPT | Performed by: FAMILY MEDICINE

## 2023-03-13 ENCOUNTER — TELEPHONE (OUTPATIENT)
Dept: FAMILY MEDICINE CLINIC | Facility: CLINIC | Age: 53
End: 2023-03-13

## 2023-03-13 DIAGNOSIS — Z12.31 ENCOUNTER FOR SCREENING MAMMOGRAM FOR MALIGNANT NEOPLASM OF BREAST: Primary | ICD-10-CM

## 2023-03-13 NOTE — TELEPHONE ENCOUNTER
Pt had biopsy done 2/10/22. Radiolgoist Impression: \"Pathology is reported as benign. This is concordant with the imaging assessment. If there is no change in the clinical breast exam, recommend annual screening mammography. \"    Pt is asking for mammogram and US. Order for mammogram pended.

## 2023-03-22 ENCOUNTER — HOSPITAL ENCOUNTER (OUTPATIENT)
Dept: MAMMOGRAPHY | Age: 53
Discharge: HOME | End: 2023-03-22
Attending: FAMILY MEDICINE
Payer: COMMERCIAL

## 2023-03-22 DIAGNOSIS — Z12.31 ENCOUNTER FOR SCREENING MAMMOGRAM FOR MALIGNANT NEOPLASM OF BREAST: ICD-10-CM

## 2023-03-22 PROCEDURE — 77063 BREAST TOMOSYNTHESIS BI: CPT | Performed by: FAMILY MEDICINE

## 2023-03-22 PROCEDURE — 77067 SCR MAMMO BI INCL CAD: CPT | Performed by: FAMILY MEDICINE

## 2023-08-16 ENCOUNTER — OFFICE VISIT (OUTPATIENT)
Dept: FAMILY MEDICINE CLINIC | Facility: CLINIC | Age: 53
End: 2023-08-16
Payer: COMMERCIAL

## 2023-08-16 VITALS
DIASTOLIC BLOOD PRESSURE: 66 MMHG | RESPIRATION RATE: 16 BRPM | OXYGEN SATURATION: 100 % | WEIGHT: 131 LBS | HEART RATE: 98 BPM | TEMPERATURE: 99 F | BODY MASS INDEX: 20.56 KG/M2 | HEIGHT: 67 IN | SYSTOLIC BLOOD PRESSURE: 110 MMHG

## 2023-08-16 DIAGNOSIS — J32.0 CHRONIC MAXILLARY SINUSITIS: ICD-10-CM

## 2023-08-16 DIAGNOSIS — J33.8 MAXILLARY POLYP OF SINUS: ICD-10-CM

## 2023-08-16 DIAGNOSIS — R30.0 DYSURIA: Primary | ICD-10-CM

## 2023-08-16 DIAGNOSIS — J34.2 DEVIATED NASAL SEPTUM: ICD-10-CM

## 2023-08-16 LAB
APPEARANCE: CLEAR
BILIRUBIN: NEGATIVE
GLUCOSE (URINE DIPSTICK): NEGATIVE MG/DL
KETONES (URINE DIPSTICK): NEGATIVE MG/DL
LEUKOCYTES: NEGATIVE
MULTISTIX LOT#: NORMAL NUMERIC
NITRITE, URINE: NEGATIVE
OCCULT BLOOD: NEGATIVE
PH, URINE: 6.5 (ref 4.5–8)
PROTEIN (URINE DIPSTICK): NEGATIVE MG/DL
SPECIFIC GRAVITY: <=1.005 (ref 1–1.03)
URINE-COLOR: YELLOW
UROBILINOGEN,SEMI-QN: 0.2 MG/DL (ref 0–1.9)

## 2023-08-16 PROCEDURE — 3074F SYST BP LT 130 MM HG: CPT | Performed by: FAMILY MEDICINE

## 2023-08-16 PROCEDURE — 3008F BODY MASS INDEX DOCD: CPT | Performed by: FAMILY MEDICINE

## 2023-08-16 PROCEDURE — 99213 OFFICE O/P EST LOW 20 MIN: CPT | Performed by: FAMILY MEDICINE

## 2023-08-16 PROCEDURE — 81003 URINALYSIS AUTO W/O SCOPE: CPT | Performed by: FAMILY MEDICINE

## 2023-08-16 PROCEDURE — 87086 URINE CULTURE/COLONY COUNT: CPT | Performed by: FAMILY MEDICINE

## 2023-08-16 PROCEDURE — 3078F DIAST BP <80 MM HG: CPT | Performed by: FAMILY MEDICINE

## 2023-08-16 RX ORDER — AMOXICILLIN AND CLAVULANATE POTASSIUM 875; 125 MG/1; MG/1
1 TABLET, FILM COATED ORAL 2 TIMES DAILY
Qty: 20 TABLET | Refills: 0 | Status: SHIPPED | OUTPATIENT
Start: 2023-08-16 | End: 2023-08-26

## 2023-08-16 NOTE — PATIENT INSTRUCTIONS
1. Dysuria  We will send urine for culture, push fluids, avoid holding urine  - URINALYSIS, AUTO, W/O SCOPE  - URINE CULTURE, ROUTINE; Future    2. Chronic maxillary sinusitis  Continue nasal saline ad shahid. We will start Augmentin 875 mg twice daily x10 days taken with food, potential GI side effects discussed    3. Deviated nasal septum  I reviewed the results of sinus CT. Discussed correction of deviated septum, patient declines vehemently    4.  Maxillary polyp of sinus  See #3

## 2023-09-14 ENCOUNTER — TELEPHONE (OUTPATIENT)
Dept: FAMILY MEDICINE CLINIC | Facility: CLINIC | Age: 53
End: 2023-09-14

## 2023-09-15 ENCOUNTER — OFFICE VISIT (OUTPATIENT)
Dept: FAMILY MEDICINE CLINIC | Facility: CLINIC | Age: 53
End: 2023-09-15
Payer: COMMERCIAL

## 2023-09-15 VITALS
DIASTOLIC BLOOD PRESSURE: 60 MMHG | SYSTOLIC BLOOD PRESSURE: 104 MMHG | HEART RATE: 78 BPM | TEMPERATURE: 99 F | BODY MASS INDEX: 21 KG/M2 | WEIGHT: 131.38 LBS | OXYGEN SATURATION: 98 %

## 2023-09-15 DIAGNOSIS — B37.0 THRUSH, ORAL: Primary | ICD-10-CM

## 2023-09-15 PROCEDURE — 3074F SYST BP LT 130 MM HG: CPT

## 2023-09-15 PROCEDURE — 99213 OFFICE O/P EST LOW 20 MIN: CPT

## 2023-09-15 PROCEDURE — 3078F DIAST BP <80 MM HG: CPT

## 2023-11-02 ENCOUNTER — OFFICE VISIT (OUTPATIENT)
Dept: FAMILY MEDICINE CLINIC | Facility: CLINIC | Age: 53
End: 2023-11-02
Payer: COMMERCIAL

## 2023-11-02 VITALS
SYSTOLIC BLOOD PRESSURE: 116 MMHG | BODY MASS INDEX: 19.93 KG/M2 | HEART RATE: 89 BPM | TEMPERATURE: 99 F | HEIGHT: 67 IN | RESPIRATION RATE: 16 BRPM | WEIGHT: 127 LBS | OXYGEN SATURATION: 97 % | DIASTOLIC BLOOD PRESSURE: 70 MMHG

## 2023-11-02 DIAGNOSIS — N30.91 HEMORRHAGIC CYSTITIS: Primary | ICD-10-CM

## 2023-11-02 LAB
APPEARANCE: CLEAR
BILIRUBIN: NEGATIVE
GLUCOSE (URINE DIPSTICK): NEGATIVE MG/DL
KETONES (URINE DIPSTICK): NEGATIVE MG/DL
LEUKOCYTES: NEGATIVE
MULTISTIX LOT#: NORMAL NUMERIC
NITRITE, URINE: NEGATIVE
OCCULT BLOOD: NEGATIVE
PH, URINE: 6 (ref 4.5–8)
PROTEIN (URINE DIPSTICK): NEGATIVE MG/DL
SPECIFIC GRAVITY: <=1.005 (ref 1–1.03)
URINE-COLOR: YELLOW
UROBILINOGEN,SEMI-QN: 0.2 MG/DL (ref 0–1.9)

## 2023-11-02 PROCEDURE — 3008F BODY MASS INDEX DOCD: CPT | Performed by: FAMILY MEDICINE

## 2023-11-02 PROCEDURE — 3074F SYST BP LT 130 MM HG: CPT | Performed by: FAMILY MEDICINE

## 2023-11-02 PROCEDURE — 81003 URINALYSIS AUTO W/O SCOPE: CPT | Performed by: FAMILY MEDICINE

## 2023-11-02 PROCEDURE — 3078F DIAST BP <80 MM HG: CPT | Performed by: FAMILY MEDICINE

## 2023-11-02 PROCEDURE — 99213 OFFICE O/P EST LOW 20 MIN: CPT | Performed by: FAMILY MEDICINE

## 2023-11-02 RX ORDER — AMOXICILLIN 500 MG/1
500 CAPSULE ORAL 3 TIMES DAILY
COMMUNITY

## 2023-11-02 RX ORDER — NITROFURANTOIN 25; 75 MG/1; MG/1
100 CAPSULE ORAL 2 TIMES DAILY
COMMUNITY
Start: 2023-10-30

## 2023-11-02 NOTE — PATIENT INSTRUCTIONS
I reviewed emergency room records as well as labs and microbiology. I advised patient that her E. coli is sensitive to the nitrofurantoin and she should finish the current prescription  I would also recommend a daily probiotic such as [employer] since she is on 2 different antibiotic for 2 different issues. Patient may benefit from daily cranberry tablets or cranberry juice as a prophylactic measure to prevent future UTIs.   Avoid holding urine, increase daily fluids, void after sex

## 2023-11-13 NOTE — TELEPHONE ENCOUNTER
Likelihood of infection or transmission from another depends on that person's presentation.   I can discuss this in more detail at her appointment aspirin 81 MG tablet Take 1 tablet by mouth daily       No current facility-administered medications for this visit. Orders Placed This Encounter   Procedures    PSA, Prostatic Specific Antigen     Standing Status:   Future     Standing Expiration Date:   11/13/2024    CBC with Auto Differential     Standing Status:   Future     Standing Expiration Date:   11/13/2024    TSH with Reflex     Standing Status:   Future     Standing Expiration Date:   11/12/2024    Lipid Panel     Standing Status:   Future     Standing Expiration Date:   11/12/2024     Order Specific Question:   Is Patient Fasting?/# of Hours     Answer:   YES 12 HOURS    Comprehensive Metabolic Panel     Standing Status:   Future     Standing Expiration Date:   11/12/2024    [facility] OFFICE OUTPATIENT VISIT 25 MINUTES [23717]        Agrees  to  colonoscopy   and  do in  [facility]  all   good   in  april    Subjective   SUBJECTIVE/OBJECTIVE:  HPI      Ashd  stable      Lipid  stable  still up  slight    Colonoscopy  due    due   and past  due    and  at  high  risk       Needs  hemorrhoids     Review of Systems   Constitutional:  Negative for fatigue and fever. HENT:  Negative for congestion, ear pain, postnasal drip, sore throat and trouble swallowing. Eyes:  Negative for pain. Respiratory:  Negative for cough, chest tightness and shortness of breath. Cardiovascular:  Negative for chest pain, palpitations and leg swelling. Gastrointestinal:  Negative for abdominal pain, blood in stool, constipation and nausea. Genitourinary:  Negative for difficulty urinating, frequency and urgency. Musculoskeletal:  Negative for arthralgias, back pain, joint swelling and neck stiffness. Skin:  Negative for rash. Neurological:  Negative for dizziness, weakness and headaches. Hematological:  Negative for adenopathy. Does not bruise/bleed easily.    Psychiatric/Behavioral:  Negative for behavioral problems, dysphoric mood and sleep

## 2024-01-08 ENCOUNTER — TELEMEDICINE (OUTPATIENT)
Dept: FAMILY MEDICINE CLINIC | Facility: CLINIC | Age: 54
End: 2024-01-08
Payer: COMMERCIAL

## 2024-01-08 DIAGNOSIS — J01.40 ACUTE NON-RECURRENT PANSINUSITIS: Primary | ICD-10-CM

## 2024-01-08 PROCEDURE — 99442 PHONE E/M BY PHYS 11-20 MIN: CPT

## 2024-01-08 RX ORDER — AMOXICILLIN AND CLAVULANATE POTASSIUM 875; 125 MG/1; MG/1
1 TABLET, FILM COATED ORAL 2 TIMES DAILY
Qty: 20 TABLET | Refills: 0 | Status: SHIPPED | OUTPATIENT
Start: 2024-01-08 | End: 2024-01-18

## 2024-01-08 NOTE — PROGRESS NOTES
Virtual/Telephone Check-In    Liz Ruvalcaba  verbally consents to a Virtual/Telephone Check-In service on 1/8/2024 .  Patient understands and accepts financial responsibility for any deductible, co-insurance and/or co-pays associated with this service.    this was a phone telemed visit    Duration of the service: 15 minutes    Problem List Items Addressed This Visit    None  Visit Diagnoses       Acute non-recurrent pansinusitis    -  Primary    Relevant Medications    amoxicillin clavulanate 875-125 MG Oral Tab           No chief complaint on file.      Medications allergies and chart reviewed  COVID-19 protocol    HPI:   Liz Ruvalcaba is a 53 year old female who schedules a virtual visit with complaints of allergy flare around 3 weeks ago and has gotten progressively worse.  She presents today with complaints of headache, maxillary toothache, bilateral ear pressure and a \"swooshing\" sound , chills, and fatigue.  Patient has tried her allergy medication, nasal rinses. Denies fevers, ear pain, sore throat, chest tightness, chest pain, shortness of breath, n/v/d.      Allergies:  Allergies   Allergen Reactions    Grass Extracts [Gramineae Pollens] OTHER (SEE COMMENTS)     Per pt, she was just seen by an Allergist, and she tested positive, that she is allergic to pollens.    Horse Allergy OTHER (SEE COMMENTS)    Lidocaine OTHER (SEE COMMENTS)     Per pt, lidocaine does not work on her.    Peanuts OTHER (SEE COMMENTS)     Per pt, she was just seen by an Allergist, and she was tested positive, that she is allergic to peanuts.    Soy Allergy NAUSEA ONLY    Sulfa Antibiotics RASH       Current Outpatient Medications   Medication Sig Dispense Refill    amoxicillin clavulanate 875-125 MG Oral Tab Take 1 tablet by mouth 2 (two) times daily for 10 days. 20 tablet 0    nitrofurantoin monohydrate macro 100 MG Oral Cap Take 1 capsule (100 mg total) by mouth 2 (two) times daily.      amoxicillin 500 MG Oral Cap Take 1 capsule  (500 mg total) by mouth 3 (three) times daily.        Past Medical History:   Diagnosis Date    Anxiety     Arthritis     Atypical mole     Change in hair     Eosinophilic esophagitis     Hearing loss     Hemorrhoids     Migraines     Pain in joints     Stress     Wears glasses     Weight loss June 2022      Past Surgical History:   Procedure Laterality Date    D & C      X 2    EGD      TONSILLECTOMY        Family History   Problem Relation Age of Onset    Breast Cancer Maternal Grandmother         age unknown    Cancer Maternal Grandmother         breast    Ovarian Cancer Maternal Aunt 69      Social History     Socioeconomic History    Marital status:    Tobacco Use    Smoking status: Former    Smokeless tobacco: Former   Vaping Use    Vaping Use: Never used   Substance and Sexual Activity    Alcohol use: Not Currently     Alcohol/week: 0.0 standard drinks of alcohol    Drug use: Not Currently         REVIEW OF SYSTEMS:   GENERAL: denies fever, chills, body aches, unusual weight gain  SKIN: no rashes  EYES:denies blurred vision or double vision  HEENT: denies nasal congestion, PND, earache, sore throat, loss of smell or taste  LUNGS: denies cough, SOB, chest heaviness, wheezing  CARDIOVASCULAR: denies chest pain, palpitations, unusual swelling in extremities  GI: denies abdominal pain, nausea, vomiting, change in bowel movements  : denies dysuria, frequency, hematuria  NEURO: denies headaches, dizziness, LOC    EXAM:   VITALS: not available as this is a telemed visit    GENERAL: Does not appear to be in acute distress  LUNG: No dyspnea with conversation  Patient appears overall fatigued, in no acute distress, normal skin tone, no respiratory distress, shortness of breath, alert and oriented x3.  rest of physical exam unable to perform as this is a telemed visit    ASSESSMENT AND PLAN:     Encounter Diagnosis   Name Primary?    Acute non-recurrent pansinusitis Yes     Problem List Items Addressed This  Visit    None  Visit Diagnoses       Acute non-recurrent pansinusitis    -  Primary    Relevant Medications    amoxicillin clavulanate 875-125 MG Oral Tab          1. Acute non-recurrent pansinusitis    - amoxicillin clavulanate 875-125 MG Oral Tab; Take 1 tablet by mouth 2 (two) times daily for 10 days.  Dispense: 20 tablet; Refill: 0     The patient indicates understanding of these issues and agrees to the plan.  The patient is asked to return if sx's persist or worsen.    Patient had an opportunity to ask questions and they were answered to her satisfaction.    \"Please note that this visit was completed using two-way, real-time interactive audio and/or video communication.  This has been done in good nichol to provide continuity of care in the best interest of the provider-patient relationship, due to the ongoing public health crisis/national emergency and because of restrictions of visitation.  There are limitations of this visit as no physical exam could be performed.  Every conscious effort was taken to allow for sufficient and adequate time.  This billing was spent on reviewing labs, medications, radiology tests and decision making.  Appropriate medical decision-making and tests are ordered as detailed in the plan of care above.\"      JOSE ENRIQUE Cooper

## 2024-01-22 ENCOUNTER — OFFICE VISIT (OUTPATIENT)
Dept: FAMILY MEDICINE CLINIC | Facility: CLINIC | Age: 54
End: 2024-01-22
Payer: COMMERCIAL

## 2024-01-22 VITALS
SYSTOLIC BLOOD PRESSURE: 118 MMHG | HEIGHT: 67 IN | RESPIRATION RATE: 18 BRPM | DIASTOLIC BLOOD PRESSURE: 72 MMHG | HEART RATE: 102 BPM | WEIGHT: 133.19 LBS | BODY MASS INDEX: 20.9 KG/M2 | OXYGEN SATURATION: 99 %

## 2024-01-22 DIAGNOSIS — H69.93 DYSFUNCTION OF BOTH EUSTACHIAN TUBES: Primary | ICD-10-CM

## 2024-01-22 DIAGNOSIS — J32.9 RECURRENT SINUSITIS: ICD-10-CM

## 2024-01-22 PROCEDURE — 99213 OFFICE O/P EST LOW 20 MIN: CPT | Performed by: FAMILY MEDICINE

## 2024-01-22 PROCEDURE — 3008F BODY MASS INDEX DOCD: CPT | Performed by: FAMILY MEDICINE

## 2024-01-22 PROCEDURE — 3074F SYST BP LT 130 MM HG: CPT | Performed by: FAMILY MEDICINE

## 2024-01-22 PROCEDURE — 3078F DIAST BP <80 MM HG: CPT | Performed by: FAMILY MEDICINE

## 2024-01-22 NOTE — PROGRESS NOTES
Liz Ruvalcaba is a 54 year old female.  Chief Complaint   Patient presents with    Tinnitus     Left ear   \"Crickets in both ears\"    HPI:   2- 3 nights ago c/o Left ear noise, lasted an hour  No acute hearing loss   Pt just getting over a sinus infection  Very anxious, pt has an old hairy dog that she has been laying with  No current outpatient medications on file.      Past Medical History:   Diagnosis Date    Anxiety     Arthritis     Atypical mole     Change in hair     Eosinophilic esophagitis     Hearing loss     Hemorrhoids     Migraines     Pain in joints     Stress     Wears glasses     Weight loss June 2022      Social History:  Social History     Socioeconomic History    Marital status:    Tobacco Use    Smoking status: Former    Smokeless tobacco: Former   Vaping Use    Vaping Use: Never used   Substance and Sexual Activity    Alcohol use: Not Currently     Alcohol/week: 0.0 standard drinks of alcohol    Drug use: Not Currently        REVIEW OF SYSTEMS:   GENERAL HEALTH: feels well otherwise, denies fatigue, appetite ok  SKIN: denies any unusual skin lesions or rashes  ENT: + nasal congestion,denies pnd, sore throat, see hpi  RESPIRATORY: denies shortness of breath with exertion,cough, wheezing  CARDIOVASCULAR: denies chest pain on exertion, edema  GI: denies abdominal pain and denies heartburn  NEURO: denies headaches  PSYCH:  anxious about her health    EXAM:   /72 (BP Location: Left arm, Patient Position: Sitting, Cuff Size: adult)   Pulse 102   Resp 18   Ht 5' 7\" (1.702 m)   Wt 133 lb 3.2 oz (60.4 kg)   LMP 02/09/2022 (Exact Date)   SpO2 99%   BMI 20.86 kg/m²   GENERAL: well developed, well nourished,in no apparent distress, well hydrated  SKIN: no rashes,no suspicious lesions  ENT: TMs: intact, good mobility, Nose: turbinates congested R>L ,throat: no erythema, pnd, or lesions  Hearing AC>BC bilaterally, no lateralization  NECK: supple,no adenopathy,no bruits, equal carotid  upstroke noted bilaterally, no thyromegaly  LUNGS: clear to auscultation, no w/r/r  CARDIO: RRR without murmur, peripheral pulses intact  PSYCH: +anxious affect    ASSESSMENT AND PLAN:     Encounter Diagnoses   Name Primary?    Dysfunction of both eustachian tubes Yes    Recurrent sinusitis      No orders of the defined types were placed in this encounter.    Meds & Refills for this Visit:  Requested Prescriptions      No prescriptions requested or ordered in this encounter     Imaging & Consults:  None  No follow-ups on file.  Patient Instructions   I discussed the diagnosis and contributing factors.  Recommend nasal saline followed by Flonase 2 sprays per nostril nightly  Proper intranasal administration reviewed  If persistent hearing loss, consider audiology eval for hearing aid   The patient indicates understanding of these issues and agrees to the plan.

## 2024-01-22 NOTE — PATIENT INSTRUCTIONS
I discussed the diagnosis and contributing factors.  Recommend nasal saline followed by Flonase 2 sprays per nostril nightly  Proper intranasal administration reviewed  If persistent hearing loss, consider audiology eval for hearing aid

## 2024-06-24 ENCOUNTER — OFFICE VISIT (OUTPATIENT)
Dept: FAMILY MEDICINE CLINIC | Facility: CLINIC | Age: 54
End: 2024-06-24

## 2024-06-24 VITALS
BODY MASS INDEX: 20.72 KG/M2 | HEART RATE: 70 BPM | HEIGHT: 67 IN | TEMPERATURE: 99 F | DIASTOLIC BLOOD PRESSURE: 65 MMHG | OXYGEN SATURATION: 98 % | WEIGHT: 132 LBS | RESPIRATION RATE: 13 BRPM | SYSTOLIC BLOOD PRESSURE: 116 MMHG

## 2024-06-24 DIAGNOSIS — J34.2 DEVIATED NASAL SEPTUM: ICD-10-CM

## 2024-06-24 DIAGNOSIS — J32.0 CHRONIC MAXILLARY SINUSITIS: Primary | ICD-10-CM

## 2024-06-24 PROCEDURE — 99214 OFFICE O/P EST MOD 30 MIN: CPT

## 2024-06-24 RX ORDER — AMOXICILLIN AND CLAVULANATE POTASSIUM 875; 125 MG/1; MG/1
1 TABLET, FILM COATED ORAL 2 TIMES DAILY
Qty: 20 TABLET | Refills: 0 | Status: SHIPPED | OUTPATIENT
Start: 2024-06-24 | End: 2024-07-04

## 2024-06-24 NOTE — PROGRESS NOTES
Liz Ruvalcaba is a 54 year old female.  HPI:     Patient in office for nasal congestion, sinus pressure and headache that she reports started 6 months ago in January.  She has tried Flonase and Claritin on occasion but said she does not feel like they work and the smell of Flonase is off putting.  She said she normally has sinus infections all the time.  She reports history of getting treated for a sinus infection but 2 weeks after antibiotic treatment is completed the infection comes back with green nasal drainage.  She had a sinus CT done in March of 2023 that showed a deviated septum and small polyp to the left maxillary sinus.      No current outpatient medications on file.      Past Medical History:    Anxiety    Arthritis    Atypical mole    Change in hair    Eosinophilic esophagitis    Hearing loss    Hemorrhoids    Migraines    Pain in joints    Stress    Wears glasses    Weight loss      Social History:  Social History     Socioeconomic History    Marital status:    Tobacco Use    Smoking status: Former    Smokeless tobacco: Former   Vaping Use    Vaping status: Never Used   Substance and Sexual Activity    Alcohol use: Not Currently     Alcohol/week: 0.0 standard drinks of alcohol    Drug use: Not Currently          REVIEW OF SYSTEMS:     Review of Systems   Constitutional:  Negative for activity change, appetite change and fatigue.   HENT:  Positive for congestion, rhinorrhea, sinus pressure and sinus pain. Negative for hearing loss and sore throat.    Eyes:  Negative for discharge and redness.   Respiratory:  Negative for shortness of breath and wheezing.    Cardiovascular:  Negative for chest pain.   Gastrointestinal:  Negative for abdominal distention and abdominal pain.   Endocrine: Negative for cold intolerance and heat intolerance.   Genitourinary:  Negative for difficulty urinating and urgency.   Musculoskeletal:  Negative for arthralgias and back pain.   Skin:  Negative for color change.    Allergic/Immunologic: Negative for environmental allergies.   Neurological:  Negative for dizziness, syncope and headaches.   Hematological:  Negative for adenopathy. Does not bruise/bleed easily.   Psychiatric/Behavioral:  Negative for agitation, behavioral problems and confusion.        EXAM:   /65   Pulse 70   Temp 99 °F (37.2 °C) (Temporal)   Resp 13   Ht 5' 7\" (1.702 m)   Wt 132 lb (59.9 kg)   SpO2 98%   BMI 20.67 kg/m²     Physical Exam  Constitutional:       Appearance: Normal appearance. She is normal weight.   HENT:      Head: Normocephalic and atraumatic.      Right Ear: Tympanic membrane is retracted.      Left Ear: Tympanic membrane is retracted.      Nose: Congestion and rhinorrhea present.      Right Turbinates: Enlarged and swollen.      Left Turbinates: Enlarged and swollen.      Right Sinus: Maxillary sinus tenderness and frontal sinus tenderness present.      Left Sinus: Maxillary sinus tenderness and frontal sinus tenderness present.      Mouth/Throat:      Mouth: Mucous membranes are moist.      Pharynx: Oropharynx is clear.   Eyes:      General: Allergic shiner present.      Extraocular Movements: Extraocular movements intact.      Conjunctiva/sclera: Conjunctivae normal.      Pupils: Pupils are equal, round, and reactive to light.   Cardiovascular:      Rate and Rhythm: Normal rate and regular rhythm.      Pulses: Normal pulses.      Heart sounds: Normal heart sounds.   Pulmonary:      Effort: Pulmonary effort is normal.      Breath sounds: Normal breath sounds.   Musculoskeletal:         General: Normal range of motion.      Cervical back: Normal range of motion.   Skin:     General: Skin is warm and dry.      Capillary Refill: Capillary refill takes less than 2 seconds.   Neurological:      Mental Status: She is alert and oriented to person, place, and time.   Psychiatric:         Mood and Affect: Mood normal.         Behavior: Behavior normal.          ASSESSMENT AND PLAN:      1. Chronic maxillary sinusitis  Augmentin sent to pharmacy and instructions provided.  Recommended daily use of zyrtec and Flonase sensitive mist.  ENT referral placed.  - amoxicillin clavulanate 875-125 MG Oral Tab; Take 1 tablet by mouth 2 (two) times daily for 10 days.  Dispense: 20 tablet; Refill: 0  - ENT - EXTERNAL    The patient indicates understanding of these issues and agrees to the plan.    Malissa Da Silva, APRN  6/24/2024

## 2024-07-30 ENCOUNTER — TELEPHONE (OUTPATIENT)
Dept: FAMILY MEDICINE CLINIC | Facility: CLINIC | Age: 54
End: 2024-07-30

## 2024-07-30 DIAGNOSIS — Z12.31 SCREENING MAMMOGRAM FOR BREAST CANCER: Primary | ICD-10-CM

## 2024-08-05 NOTE — PROGRESS NOTES
Liz Ruvalcaba is a 54 year old female.  HPI:     Patient in office for ER follow up for urinary tract infection.  She was seen at [facility] Emergency Room 3 days ago and prescribed Macrobid BID for 7 days and pyridium.  She reports she is still feeling burning with urination and flank pain.  She reports having a history of frequent UTI's.  Symptoms started 6 days ago of nausea, vomiting and abdominal cramping and then 2 days later she had bright red urine.  Culture was done in ER and e.choli was noted.      In ER a CT was done that showed a small lung nodule and she would like to know what to do going forward.  She has a smoking history and family history of lung cancer and is anxious about this.    Patient has questions regarding vaginal irritation she was experiencing.  She was prescribed a vaginal estrogen cream by her obgyn but said it is very expensive.  She is unclear is she should keep using this cream or not.      No current outpatient medications on file.      Past Medical History:    Anxiety    Arthritis    Atypical mole    Change in hair    Eosinophilic esophagitis    Hearing loss    Hemorrhoids    Migraines    Pain in joints    Stress    Wears glasses    Weight loss      Social History:  Social History     Socioeconomic History    Marital status:    Tobacco Use    Smoking status: Former    Smokeless tobacco: Former   Vaping Use    Vaping status: Never Used   Substance and Sexual Activity    Alcohol use: Not Currently     Alcohol/week: 0.0 standard drinks of alcohol    Drug use: Not Currently          REVIEW OF SYSTEMS:     Review of Systems   Constitutional:  Negative for activity change, appetite change and fatigue.   HENT:  Negative for congestion, hearing loss and sore throat.    Eyes:  Negative for discharge and redness.   Respiratory:  Negative for shortness of breath and wheezing.    Cardiovascular:  Negative for chest pain.   Gastrointestinal:  Negative for abdominal distention and  abdominal pain.   Endocrine: Negative for cold intolerance and heat intolerance.   Genitourinary:  Positive for dysuria and flank pain. Negative for difficulty urinating.   Musculoskeletal:  Negative for arthralgias and back pain.   Skin:  Negative for color change.   Allergic/Immunologic: Negative for environmental allergies.   Neurological:  Negative for dizziness, syncope and headaches.   Hematological:  Negative for adenopathy. Does not bruise/bleed easily.   Psychiatric/Behavioral:  Negative for agitation, behavioral problems and confusion.        EXAM:   /60 (BP Location: Left arm, Patient Position: Sitting, Cuff Size: adult)   Pulse 98   Temp 99.1 °F (37.3 °C) (Temporal)   Resp 12   Ht 5' 6.5\" (1.689 m)   Wt 127 lb (57.6 kg)   SpO2 98%   BMI 20.19 kg/m²     Physical Exam  Constitutional:       Appearance: Normal appearance. She is normal weight.   HENT:      Head: Normocephalic and atraumatic.      Nose: Nose normal.      Mouth/Throat:      Mouth: Mucous membranes are moist.      Pharynx: Oropharynx is clear.   Eyes:      Extraocular Movements: Extraocular movements intact.      Conjunctiva/sclera: Conjunctivae normal.      Pupils: Pupils are equal, round, and reactive to light.   Cardiovascular:      Rate and Rhythm: Normal rate and regular rhythm.      Pulses: Normal pulses.      Heart sounds: Normal heart sounds.   Pulmonary:      Effort: Pulmonary effort is normal.      Breath sounds: Normal breath sounds.   Musculoskeletal:         General: Normal range of motion.      Cervical back: Normal range of motion.   Skin:     General: Skin is warm and dry.      Capillary Refill: Capillary refill takes less than 2 seconds.   Neurological:      Mental Status: She is alert and oriented to person, place, and time.   Psychiatric:         Mood and Affect: Mood normal.         Behavior: Behavior normal.            ASSESSMENT AND PLAN:     1. Acute cystitis with hematuria  Cipro sent to pharmacy and  instructions provided  - ciprofloxacin 500 MG Oral Tab; Take 1 tablet (500 mg total) by mouth 2 (two) times daily for 7 days.  Dispense: 14 tablet; Refill: 0    2. Vaginal dryness  Recommended stephanie of Estradial cream  - estradiol 0.1 MG/GM Vaginal Cream; Place 2 g vaginally daily. 3 time a week as needed  Dispense: 42.3 g; Refill: 0    3. Lung nodule seen on imaging study  Recommended yearly imaging to monitor      35 minutes were spent with this patient on assessment, education, and discussion of treatment plan.        The patient indicates understanding of these issues and agrees to the plan.      Malissa Da Silva, APRN  8/5/2024

## 2024-08-05 NOTE — PROGRESS NOTES
Subjective:   Liz Ruvalcaba is a 54 year old female who presents for hospital follow up.   She was discharged from {Discharged from which location:7092::} to {Discharged to which location:7093}   Admit Date: ***  Discharge Date: ***  Hospital Discharge Diagnosis: ***    Interactive contact within 2 business days post discharge first initiated on Date: ***    During the visit, the following was completed:  Obtained and reviewed {Documents reviewed at [facility]:3594}  Reviewed {Tests reviewed at [facility]:2803}    HPI: ***    History/Other:   Current Medications:  Medication Reconciliation:  I am aware of an inpatient discharge within the last 30 days.  The discharge medication list has been reconciled with the patient's current medication list and reviewed by me.  See medication list for additions of new medication, and changes to current doses of medications and discontinued medications.  No outpatient medications have been marked as taking for the 8/7/24 encounter (Office Visit) with Malissa Da Silva APRN.       Review of Systems  {Female Review of Systems (Optional):7352}    Objective:   Physical Exam  {Use this to document a Female Complete Physical Exam (pelvic deferred) - Defaults to Blank -DEL to delete as it is not needed for AWV but is needed for CPX or Medicare Supervisit:1203}    There were no vitals taken for this visit. Estimated body mass index is 20.67 kg/m² as calculated from the following:    Height as of 6/24/24: 5' 7\" (1.702 m).    Weight as of 6/24/24: 132 lb (59.9 kg).    Assessment & Plan:   There are no diagnoses linked to this encounter.    {Tip  Follow Up:8307}  No follow-ups on file.

## 2024-08-07 ENCOUNTER — OFFICE VISIT (OUTPATIENT)
Dept: FAMILY MEDICINE CLINIC | Facility: CLINIC | Age: 54
End: 2024-08-07
Payer: COMMERCIAL

## 2024-08-07 VITALS
OXYGEN SATURATION: 98 % | WEIGHT: 127 LBS | TEMPERATURE: 99 F | RESPIRATION RATE: 12 BRPM | BODY MASS INDEX: 20.17 KG/M2 | HEIGHT: 66.5 IN | SYSTOLIC BLOOD PRESSURE: 102 MMHG | DIASTOLIC BLOOD PRESSURE: 60 MMHG | HEART RATE: 98 BPM

## 2024-08-07 DIAGNOSIS — R11.0 NAUSEA: ICD-10-CM

## 2024-08-07 DIAGNOSIS — R91.1 LUNG NODULE SEEN ON IMAGING STUDY: ICD-10-CM

## 2024-08-07 DIAGNOSIS — N30.01 ACUTE CYSTITIS WITH HEMATURIA: Primary | ICD-10-CM

## 2024-08-07 DIAGNOSIS — N89.8 VAGINAL DRYNESS: ICD-10-CM

## 2024-08-07 PROCEDURE — 99214 OFFICE O/P EST MOD 30 MIN: CPT

## 2024-08-07 RX ORDER — ESTRADIOL 0.1 MG/G
2 CREAM VAGINAL DAILY
Qty: 42.3 G | Refills: 0 | Status: SHIPPED | OUTPATIENT
Start: 2024-08-07

## 2024-08-07 RX ORDER — NITROFURANTOIN 25; 75 MG/1; MG/1
100 CAPSULE ORAL 2 TIMES DAILY
COMMUNITY
Start: 2024-08-04

## 2024-08-07 RX ORDER — CIPROFLOXACIN 500 MG/1
500 TABLET, FILM COATED ORAL 2 TIMES DAILY
Qty: 14 TABLET | Refills: 0 | Status: SHIPPED | OUTPATIENT
Start: 2024-08-07 | End: 2024-08-14

## 2024-08-12 ENCOUNTER — HOSPITAL ENCOUNTER (OUTPATIENT)
Dept: MAMMOGRAPHY | Age: 54
Discharge: HOME | End: 2024-08-12
Attending: FAMILY MEDICINE
Payer: COMMERCIAL

## 2024-08-12 DIAGNOSIS — Z12.31 SCREENING MAMMOGRAM FOR BREAST CANCER: ICD-10-CM

## 2024-08-12 PROCEDURE — 77063 BREAST TOMOSYNTHESIS BI: CPT | Performed by: FAMILY MEDICINE

## 2024-08-12 PROCEDURE — 77067 SCR MAMMO BI INCL CAD: CPT | Performed by: FAMILY MEDICINE

## 2024-08-14 ENCOUNTER — TELEPHONE (OUTPATIENT)
Dept: FAMILY MEDICINE CLINIC | Facility: CLINIC | Age: 54
End: 2024-08-14

## 2024-08-14 DIAGNOSIS — N39.0 URINARY TRACT INFECTION WITHOUT HEMATURIA, SITE UNSPECIFIED: Primary | ICD-10-CM

## 2024-08-14 NOTE — TELEPHONE ENCOUNTER
Pt went to ER on 8/3/24 acute pyelonephritis, then saw Malissa on 8/7/24 for acute cystitis and was prescribed Cipro 500mg BID x7days.  She took her last dose early this morning.  Reports her sx have resolved, but has some discomfort near both hip bones. She thinks this may be the ureters??  She asks if she should repeat a urine C&S or take additional Cipro?

## 2024-08-14 NOTE — TELEPHONE ENCOUNTER
WAS IN LAST WEEK AND GOT PRESCRIPTION FOR KIDNEY INFECTION, DID A 7 DAY OF MEDS. SYMPTOMS ARE PRETTY MUCH GONE BUT STILL THERE A BIT. UNSURE IF A FEW MORE DAYS OF MEDS NEEDED OR IF NORMAL

## 2024-08-15 ENCOUNTER — LABORATORY ENCOUNTER (OUTPATIENT)
Dept: LAB | Age: 54
End: 2024-08-15
Attending: FAMILY MEDICINE
Payer: COMMERCIAL

## 2024-08-15 DIAGNOSIS — N39.0 URINARY TRACT INFECTION WITHOUT HEMATURIA, SITE UNSPECIFIED: ICD-10-CM

## 2024-08-15 PROCEDURE — 87086 URINE CULTURE/COLONY COUNT: CPT | Performed by: FAMILY MEDICINE

## 2024-08-15 PROCEDURE — 87088 URINE BACTERIA CULTURE: CPT | Performed by: FAMILY MEDICINE

## 2024-08-15 PROCEDURE — 87186 SC STD MICRODIL/AGAR DIL: CPT | Performed by: FAMILY MEDICINE

## 2024-08-19 DIAGNOSIS — N39.0 URINARY TRACT INFECTION WITHOUT HEMATURIA, SITE UNSPECIFIED: Primary | ICD-10-CM

## 2024-08-19 RX ORDER — NITROFURANTOIN 25; 75 MG/1; MG/1
100 CAPSULE ORAL 2 TIMES DAILY
Qty: 14 CAPSULE | Refills: 0 | Status: SHIPPED | OUTPATIENT
Start: 2024-08-19 | End: 2024-08-26

## 2024-08-28 ENCOUNTER — LABORATORY ENCOUNTER (OUTPATIENT)
Dept: LAB | Age: 54
End: 2024-08-28
Attending: FAMILY MEDICINE
Payer: COMMERCIAL

## 2024-08-28 ENCOUNTER — TELEPHONE (OUTPATIENT)
Dept: FAMILY MEDICINE CLINIC | Facility: CLINIC | Age: 54
End: 2024-08-28

## 2024-08-28 DIAGNOSIS — N39.0 URINARY TRACT INFECTION WITHOUT HEMATURIA, SITE UNSPECIFIED: ICD-10-CM

## 2024-08-28 PROCEDURE — 87086 URINE CULTURE/COLONY COUNT: CPT | Performed by: FAMILY MEDICINE

## 2024-08-28 PROCEDURE — 87088 URINE BACTERIA CULTURE: CPT | Performed by: FAMILY MEDICINE

## 2024-08-28 PROCEDURE — 87186 SC STD MICRODIL/AGAR DIL: CPT | Performed by: FAMILY MEDICINE

## 2024-08-28 NOTE — TELEPHONE ENCOUNTER
Spoke with pt. She is questioning the dose/ directions of estradiol vaginal cream that was prescribed that day.  States she has not started it yet, because she was being treated for UTI's (twice).    States she has been reading about it though, and what she has read says to use uf daily, but the directions on her script are to use it 3 times per week an needed.    ALSO, pt reports the vaginal dryness and itching she had when she was seen has since resolved (another reason she hasn't started it yet)    So, she asks if she should start it? And if so, are those the correct directions?

## 2024-08-28 NOTE — TELEPHONE ENCOUNTER
Has some medication questions from visit with richard about dosage and if additional meds are needed.

## 2024-08-29 NOTE — TELEPHONE ENCOUNTER
In office I had mentioned to her to use it daily for 1-2 weeks while symptoms were persistent and then 3 times a week as needed. If she is not having symptoms she does not need to use the cream but she may find symptoms returning and having pain with intercourse due to vaginal dryness/atrophy.  It is ultimately up to her if she would like to use it 3 times a week or not.

## 2024-08-29 NOTE — TELEPHONE ENCOUNTER
Spoke with patient and she verbalized understanding.     She states she was reading the insert and it stated something about taking progesterone along with the cream due to something with the uterine lining. Does she need to be taking Progesterone?

## 2024-08-30 DIAGNOSIS — N39.0 URINARY TRACT INFECTION WITHOUT HEMATURIA, SITE UNSPECIFIED: Primary | ICD-10-CM

## 2024-08-30 RX ORDER — LEVOFLOXACIN 500 MG/1
500 TABLET, FILM COATED ORAL DAILY
Qty: 7 TABLET | Refills: 0 | Status: SHIPPED | OUTPATIENT
Start: 2024-08-30 | End: 2024-09-06

## 2024-09-04 ENCOUNTER — TELEPHONE (OUTPATIENT)
Dept: FAMILY MEDICINE CLINIC | Facility: CLINIC | Age: 54
End: 2024-09-04

## 2024-09-04 DIAGNOSIS — N39.0 URINARY TRACT INFECTION WITHOUT HEMATURIA, SITE UNSPECIFIED: Primary | ICD-10-CM

## 2024-09-04 DIAGNOSIS — N30.01 ACUTE CYSTITIS WITH HEMATURIA: ICD-10-CM

## 2024-09-04 PROCEDURE — 81003 URINALYSIS AUTO W/O SCOPE: CPT | Performed by: FAMILY MEDICINE

## 2024-09-04 NOTE — TELEPHONE ENCOUNTER
Spoke with patient.  Patient is asking if an order for a urine dip could be placed as well as doing the urine culture.     She would also like Dr. Bell to know that she schedule an appointment with Dr. Coburn for 9/16.  She was unable to get into urogyne until next year. There were also issues finding one that would take her insurance.      Order for urine dip pended for approval if agreeable.

## 2024-09-04 NOTE — TELEPHONE ENCOUNTER
Patient coming in this Saturday for repeat urine cx -     Urogyn appointments out a year - but she has an appointment with Dr. Singh 09/17/24    For Saturday nurse appointment - patient wants to know if urine will be dipped in office or if only urine cx will be sent out.    Future Appointments   Date Time Provider Department Center   9/7/2024  9:45 AM [facility] NURSE ANABELLA [facility]

## 2024-09-07 ENCOUNTER — NURSE ONLY (OUTPATIENT)
Dept: FAMILY MEDICINE CLINIC | Facility: CLINIC | Age: 54
End: 2024-09-07
Payer: COMMERCIAL

## 2024-09-07 DIAGNOSIS — N39.0 URINARY TRACT INFECTION WITHOUT HEMATURIA, SITE UNSPECIFIED: ICD-10-CM

## 2024-09-07 LAB
APPEARANCE: CLEAR
BILIRUBIN: NEGATIVE
GLUCOSE (URINE DIPSTICK): NEGATIVE MG/DL
KETONES (URINE DIPSTICK): NEGATIVE MG/DL
LEUKOCYTES: NEGATIVE
MULTISTIX LOT#: ABNORMAL NUMERIC
NITRITE, URINE: NEGATIVE
PH, URINE: 6 (ref 4.5–8)
PROTEIN (URINE DIPSTICK): NEGATIVE MG/DL
SPECIFIC GRAVITY: 1.01 (ref 1–1.03)
UROBILINOGEN,SEMI-QN: 0.2 MG/DL (ref 0–1.9)

## 2024-09-07 PROCEDURE — 87086 URINE CULTURE/COLONY COUNT: CPT | Performed by: FAMILY MEDICINE

## 2024-11-07 ENCOUNTER — OFFICE VISIT (OUTPATIENT)
Dept: FAMILY MEDICINE CLINIC | Facility: CLINIC | Age: 54
End: 2024-11-07
Payer: COMMERCIAL

## 2024-11-07 VITALS
TEMPERATURE: 98 F | HEART RATE: 60 BPM | HEIGHT: 66 IN | WEIGHT: 128.25 LBS | SYSTOLIC BLOOD PRESSURE: 100 MMHG | BODY MASS INDEX: 20.61 KG/M2 | RESPIRATION RATE: 12 BRPM | DIASTOLIC BLOOD PRESSURE: 60 MMHG

## 2024-11-07 DIAGNOSIS — Z13.220 LIPID SCREENING: ICD-10-CM

## 2024-11-07 DIAGNOSIS — R73.09 ELEVATED GLUCOSE: ICD-10-CM

## 2024-11-07 DIAGNOSIS — Z12.11 COLON CANCER SCREENING: ICD-10-CM

## 2024-11-07 DIAGNOSIS — J30.89 SEASONAL ALLERGIC RHINITIS DUE TO OTHER ALLERGIC TRIGGER: ICD-10-CM

## 2024-11-07 DIAGNOSIS — Z13.0 SCREENING FOR DEFICIENCY ANEMIA: ICD-10-CM

## 2024-11-07 DIAGNOSIS — Z00.00 WELLNESS EXAMINATION: Primary | ICD-10-CM

## 2024-11-07 DIAGNOSIS — R30.0 DYSURIA: ICD-10-CM

## 2024-11-07 DIAGNOSIS — Z13.29 THYROID DISORDER SCREEN: ICD-10-CM

## 2024-11-07 PROBLEM — N39.0 URINARY TRACT INFECTION WITHOUT HEMATURIA: Status: ACTIVE | Noted: 2024-09-16

## 2024-11-07 LAB
ALBUMIN SERPL-MCNC: 4.5 G/DL (ref 3.2–4.8)
ALBUMIN/GLOB SERPL: 1.7 {RATIO} (ref 1–2)
ALP LIVER SERPL-CCNC: 76 U/L
ALT SERPL-CCNC: 26 U/L
ANION GAP SERPL CALC-SCNC: 5 MMOL/L (ref 0–18)
APPEARANCE: CLEAR
AST SERPL-CCNC: 32 U/L (ref ?–34)
BASOPHILS # BLD AUTO: 0.1 X10(3) UL (ref 0–0.2)
BASOPHILS NFR BLD AUTO: 1.5 %
BILIRUB SERPL-MCNC: 0.4 MG/DL (ref 0.3–1.2)
BILIRUBIN: NEGATIVE
BUN BLD-MCNC: 14 MG/DL (ref 9–23)
CALCIUM BLD-MCNC: 10.3 MG/DL (ref 8.7–10.4)
CHLORIDE SERPL-SCNC: 107 MMOL/L (ref 98–112)
CHOLEST SERPL-MCNC: 237 MG/DL (ref ?–200)
CO2 SERPL-SCNC: 28 MMOL/L (ref 21–32)
CREAT BLD-MCNC: 0.74 MG/DL
EGFRCR SERPLBLD CKD-EPI 2021: 97 ML/MIN/1.73M2 (ref 60–?)
EOSINOPHIL # BLD AUTO: 0.13 X10(3) UL (ref 0–0.7)
EOSINOPHIL NFR BLD AUTO: 1.9 %
ERYTHROCYTE [DISTWIDTH] IN BLOOD BY AUTOMATED COUNT: 12 %
EST. AVERAGE GLUCOSE BLD GHB EST-MCNC: 114 MG/DL (ref 68–126)
FASTING PATIENT LIPID ANSWER: YES
FASTING STATUS PATIENT QL REPORTED: YES
GLOBULIN PLAS-MCNC: 2.6 G/DL (ref 2–3.5)
GLUCOSE (URINE DIPSTICK): NEGATIVE MG/DL
GLUCOSE BLD-MCNC: 99 MG/DL (ref 70–99)
HBA1C MFR BLD: 5.6 % (ref ?–5.7)
HCT VFR BLD AUTO: 42.6 %
HDLC SERPL-MCNC: 68 MG/DL (ref 40–59)
HGB BLD-MCNC: 14.1 G/DL
IMM GRANULOCYTES # BLD AUTO: 0.01 X10(3) UL (ref 0–1)
IMM GRANULOCYTES NFR BLD: 0.1 %
KETONES (URINE DIPSTICK): NEGATIVE MG/DL
LDLC SERPL CALC-MCNC: 148 MG/DL (ref ?–100)
LEUKOCYTES: NEGATIVE
LYMPHOCYTES # BLD AUTO: 2.21 X10(3) UL (ref 1–4)
LYMPHOCYTES NFR BLD AUTO: 32.9 %
MCH RBC QN AUTO: 31.5 PG (ref 26–34)
MCHC RBC AUTO-ENTMCNC: 33.1 G/DL (ref 31–37)
MCV RBC AUTO: 95.3 FL
MONOCYTES # BLD AUTO: 0.58 X10(3) UL (ref 0.1–1)
MONOCYTES NFR BLD AUTO: 8.6 %
MULTISTIX LOT#: NORMAL NUMERIC
NEUTROPHILS # BLD AUTO: 3.68 X10 (3) UL (ref 1.5–7.7)
NEUTROPHILS # BLD AUTO: 3.68 X10(3) UL (ref 1.5–7.7)
NEUTROPHILS NFR BLD AUTO: 55 %
NITRITE, URINE: NEGATIVE
NONHDLC SERPL-MCNC: 169 MG/DL (ref ?–130)
OCCULT BLOOD: NEGATIVE
OSMOLALITY SERPL CALC.SUM OF ELEC: 291 MOSM/KG (ref 275–295)
PH, URINE: 6 (ref 4.5–8)
PLATELET # BLD AUTO: 218 10(3)UL (ref 150–450)
POTASSIUM SERPL-SCNC: 3.6 MMOL/L (ref 3.5–5.1)
PROT SERPL-MCNC: 7.1 G/DL (ref 5.7–8.2)
PROTEIN (URINE DIPSTICK): NEGATIVE MG/DL
RBC # BLD AUTO: 4.47 X10(6)UL
SODIUM SERPL-SCNC: 140 MMOL/L (ref 136–145)
SPECIFIC GRAVITY: 1.01 (ref 1–1.03)
TRIGL SERPL-MCNC: 120 MG/DL (ref 30–149)
TSI SER-ACNC: 1.81 UIU/ML (ref 0.55–4.78)
URINE-COLOR: YELLOW
UROBILINOGEN,SEMI-QN: 0.2 MG/DL (ref 0–1.9)
VLDLC SERPL CALC-MCNC: 23 MG/DL (ref 0–30)
WBC # BLD AUTO: 6.7 X10(3) UL (ref 4–11)

## 2024-11-07 PROCEDURE — 87086 URINE CULTURE/COLONY COUNT: CPT

## 2024-11-07 PROCEDURE — 80050 GENERAL HEALTH PANEL: CPT

## 2024-11-07 PROCEDURE — 80061 LIPID PANEL: CPT

## 2024-11-07 PROCEDURE — 83036 HEMOGLOBIN GLYCOSYLATED A1C: CPT

## 2024-11-07 NOTE — PROGRESS NOTES
HPI:   Liz Ruvalcaba is a 54 year old female who presents for an Annual Health Visit.     She stopped taking birth control 3 years ago and reports periods stopped.  She has been using estradiol cream twice a week.  She saw OB for pap.      Allergies:     Allergies   Allergen Reactions    Grass Extracts [Gramineae Pollens] OTHER (SEE COMMENTS)     Per pt, she was just seen by an Allergist, and she tested positive, that she is allergic to pollens.    Horse Allergy OTHER (SEE COMMENTS)    Lidocaine OTHER (SEE COMMENTS)     Per pt, lidocaine does not work on her.    Peanuts OTHER (SEE COMMENTS)     Per pt, she was just seen by an Allergist, and she was tested positive, that she is allergic to peanuts.    Soy Allergy NAUSEA ONLY    Sulfa Antibiotics RASH       CURRENT MEDICATIONS   Current Outpatient Medications   Medication Sig Dispense Refill    Lactobacillus (PROBIOTIC ACIDOPHILUS OR) Take by mouth.      D-MANNOSE OR Take by mouth.      estradiol 0.1 MG/GM Vaginal Cream Place 2 g vaginally daily. 3 time a week as needed 42.3 g 0    Cranberry 450 MG Oral Tab Take by mouth As Directed.        HISTORICAL INFORMATION   Past Medical History:    Anxiety    Arthritis    Atypical mole    Change in hair    Eosinophilic esophagitis    Hearing loss    Hemorrhoids    Migraines    Pain in joints    Stress    Wears glasses    Weight loss      Past Surgical History:   Procedure Laterality Date    D & c      X 2    Egd      Tonsillectomy        Family History   Problem Relation Age of Onset    Breast Cancer Maternal Grandmother         age unknown    Cancer Maternal Grandmother         breast    Ovarian Cancer Maternal Aunt 69      SOCIAL HISTORY   Social History     Socioeconomic History    Marital status:    Tobacco Use    Smoking status: Former    Smokeless tobacco: Former   Vaping Use    Vaping status: Never Used   Substance and Sexual Activity    Alcohol use: Not Currently     Alcohol/week: 0.0 standard drinks of  alcohol    Drug use: Not Currently     Social History     Social History Narrative    Not on file        REVIEW OF SYSTEMS:     Review of Systems   Constitutional:  Negative for activity change, appetite change and fatigue.   HENT:  Negative for congestion, hearing loss and sore throat.    Eyes:  Negative for discharge and redness.   Respiratory:  Negative for shortness of breath and wheezing.    Cardiovascular:  Negative for chest pain.   Gastrointestinal:  Negative for abdominal distention and abdominal pain.   Endocrine: Negative for cold intolerance and heat intolerance.   Genitourinary:  Negative for difficulty urinating and urgency.   Musculoskeletal:  Negative for arthralgias and back pain.   Skin:  Negative for color change.   Allergic/Immunologic: Negative for environmental allergies.   Neurological:  Negative for dizziness, syncope and headaches.   Hematological:  Negative for adenopathy. Does not bruise/bleed easily.   Psychiatric/Behavioral:  Negative for agitation, behavioral problems and confusion.          EXAM:   /60 (BP Location: Right arm, Patient Position: Sitting, Cuff Size: adult)   Pulse 60   Temp 97.7 °F (36.5 °C) (Temporal)   Resp 12   Ht 5' 6\" (1.676 m)   Wt 128 lb 4 oz (58.2 kg)   LMP 02/09/2022 (Exact Date)   BMI 20.70 kg/m²    Wt Readings from Last 6 Encounters:   11/07/24 128 lb 4 oz (58.2 kg)   08/07/24 127 lb (57.6 kg)   06/24/24 132 lb (59.9 kg)   01/22/24 133 lb 3.2 oz (60.4 kg)   11/02/23 127 lb (57.6 kg)   09/15/23 131 lb 6 oz (59.6 kg)     Body mass index is 20.7 kg/m².    Physical Exam  Constitutional:       Appearance: Normal appearance. She is normal weight.   HENT:      Head: Normocephalic and atraumatic.      Right Ear: Tympanic membrane normal.      Left Ear: Tympanic membrane normal.      Nose: Nose normal.      Mouth/Throat:      Mouth: Mucous membranes are moist.      Pharynx: Oropharynx is clear.   Eyes:      Extraocular Movements: Extraocular movements  intact.      Conjunctiva/sclera: Conjunctivae normal.      Pupils: Pupils are equal, round, and reactive to light.   Cardiovascular:      Rate and Rhythm: Normal rate and regular rhythm.      Pulses: Normal pulses.      Heart sounds: Normal heart sounds.   Pulmonary:      Effort: Pulmonary effort is normal.      Breath sounds: Normal breath sounds.   Abdominal:      General: Bowel sounds are normal.      Palpations: Abdomen is soft.   Musculoskeletal:         General: Normal range of motion.      Cervical back: Normal range of motion.   Skin:     General: Skin is warm and dry.      Capillary Refill: Capillary refill takes less than 2 seconds.   Neurological:      Mental Status: She is alert and oriented to person, place, and time.   Psychiatric:         Mood and Affect: Mood normal.         Behavior: Behavior normal.          ASSESSMENT AND PLAN:   Liz was seen today for physical.    Diagnoses and all orders for this visit:    Wellness examination  -     Comp Metabolic Panel (14); Future  -     CBC With Differential With Platelet; Future  -     Hemoglobin A1C; Future  -     Lipid Panel; Future  -     TSH W Reflex To Free T4; Future  -     Comp Metabolic Panel (14)  -     CBC With Differential With Platelet  -     Hemoglobin A1C  -     Lipid Panel  -     TSH W Reflex To Free T4    Elevated glucose  -     Hemoglobin A1C; Future  -     Hemoglobin A1C    Colon cancer screening  -     COLONOSCOPY SCREENING - REFERRAL  -     [facility] - INT (MUST PICK PROVIDER)    Lipid screening  -     Lipid Panel; Future  -     Lipid Panel    Screening for deficiency anemia  -     CBC With Differential With Platelet; Future  -     CBC With Differential With Platelet    Thyroid disorder screen  -     TSH W Reflex To Free T4; Future  -     TSH W Reflex To Free T4    Dysuria  -     Urine Culture, Routine; Future  -     URINALYSIS, AUTO, W/O SCOPE  -     Urine Culture, Routine    Seasonal allergic rhinitis due to other allergic  trigger    Patient used over the counter allergy medication for symptoms    The patient indicates understanding of these issues and agrees to the plan.    Problem List:  Patient Active Problem List   Diagnosis    Eosinophilic esophagitis    HPV (human papilloma virus) infection    Allergic rhinitis due to allergen    Migraines    Urinary tract infection without hematuria       JOSE ENRIQUE Donohue  11/7/2024  3:19 PM

## 2024-11-08 ENCOUNTER — PATIENT MESSAGE (OUTPATIENT)
Dept: FAMILY MEDICINE CLINIC | Facility: CLINIC | Age: 54
End: 2024-11-08

## 2024-11-08 DIAGNOSIS — E78.00 ELEVATED CHOLESTEROL: Primary | ICD-10-CM

## 2024-11-14 DIAGNOSIS — N89.8 VAGINAL DRYNESS: ICD-10-CM

## 2024-11-14 RX ORDER — ESTRADIOL 0.1 MG/G
2 CREAM VAGINAL DAILY
Qty: 42.3 G | Refills: 0 | Status: SHIPPED | OUTPATIENT
Start: 2024-11-14

## 2024-11-14 NOTE — TELEPHONE ENCOUNTER
Last refill:  08/07/24  qtY: 42.3 g  W/ 0 refills  Last ov: 11/07/24 (richard burns) physical    Requested Prescriptions     Pending Prescriptions Disp Refills    estradiol 0.1 MG/GM Vaginal Cream 42.3 g 0     Sig: Place 2 g vaginally daily. 3 time a week as needed     No future appointments.

## 2025-01-07 ENCOUNTER — MED REC SCAN ONLY (OUTPATIENT)
Dept: FAMILY MEDICINE CLINIC | Facility: CLINIC | Age: 55
End: 2025-01-07

## 2025-01-28 DIAGNOSIS — N89.8 VAGINAL DRYNESS: ICD-10-CM

## 2025-01-28 RX ORDER — ESTRADIOL 0.1 MG/G
2 CREAM VAGINAL DAILY
Qty: 42.3 G | Refills: 0 | Status: SHIPPED | OUTPATIENT
Start: 2025-01-28

## 2025-01-28 NOTE — TELEPHONE ENCOUNTER
LOV:11-7-24    LAST LAB:11-7-24    LAST RX:  estradiol 0.1 MG/GM Vaginal Cream 42.3 g 0 11/14/2024 --   Sig:   Place 2 g vaginally daily. 3 time a         Next OV: No future appointments.       PROTOCOL  Gynecology Medication Protocol Failed 01/28/2025 01:33 PM   Protocol Details PASS--PENDING LAST PAP WNL--VIA MANUAL LOOKUP    Mammogram in past 12 months    Medication is active on med list    Physical or Pelvic/Breast in past 12 or next 3 mos--VIA MANUAL LOOKUP

## 2025-04-10 DIAGNOSIS — N89.8 VAGINAL DRYNESS: ICD-10-CM

## 2025-04-11 RX ORDER — ESTRADIOL 0.1 MG/G
2 CREAM VAGINAL DAILY
Qty: 42.3 G | Refills: 0 | Status: SHIPPED | OUTPATIENT
Start: 2025-04-11

## 2025-04-11 NOTE — TELEPHONE ENCOUNTER
estradiol 0.1 MG/GM Vaginal Cream   Gynecology Medication Protocol Failed 04/10/2025 08:13 PM   Protocol Details PASS--PENDING LAST PAP WNL--VIA MANUAL LOOKUP    Mammogram in past 12 months    Medication is active on med list    Physical or Pelvic/Breast in past 12 or next 3 mos--VIA MANUAL LOOKUP   Last office visit:  11/7/24  No future appointments.  Last filled:  1/28/25  42.3 g   Last labs: 11/7/24   Last BP:  100/60

## 2025-06-03 ENCOUNTER — OFFICE VISIT (OUTPATIENT)
Dept: FAMILY MEDICINE CLINIC | Facility: CLINIC | Age: 55
End: 2025-06-03
Payer: COMMERCIAL

## 2025-06-03 VITALS
OXYGEN SATURATION: 99 % | HEIGHT: 66 IN | SYSTOLIC BLOOD PRESSURE: 100 MMHG | HEART RATE: 70 BPM | RESPIRATION RATE: 15 BRPM | BODY MASS INDEX: 20.86 KG/M2 | WEIGHT: 129.81 LBS | TEMPERATURE: 99 F | DIASTOLIC BLOOD PRESSURE: 60 MMHG

## 2025-06-03 DIAGNOSIS — S91.012A LACERATION OF LEFT ANKLE, INITIAL ENCOUNTER: ICD-10-CM

## 2025-06-03 DIAGNOSIS — Z23 NEED FOR VACCINATION: Primary | ICD-10-CM

## 2025-06-03 PROBLEM — N39.0 URINARY TRACT INFECTION WITHOUT HEMATURIA: Status: RESOLVED | Noted: 2024-09-16 | Resolved: 2025-06-03

## 2025-06-03 PROCEDURE — 99213 OFFICE O/P EST LOW 20 MIN: CPT

## 2025-06-03 PROCEDURE — 90471 IMMUNIZATION ADMIN: CPT

## 2025-06-03 PROCEDURE — 90715 TDAP VACCINE 7 YRS/> IM: CPT

## 2025-06-03 NOTE — PROGRESS NOTES
Liz Ruvalcaba is a 55 year old female.  HPI:     Patient in office for laceration to her left ankle that occurred yesterday on an outdoor metal planter.  She has not had a tdap vaccines and needs an booster.      Current Medications[1]   Past Medical History[2]   Social History:  Short Social Hx on File[3]       REVIEW OF SYSTEMS:   GENERAL HEALTH: feels well otherwise  SKIN: see hpi  RESPIRATORY: denies shortness of breath with exertion  CARDIOVASCULAR: denies chest pain on exertion  GI: denies abdominal pain and denies heartburn  NEURO: denies headaches    EXAM:   /60   Pulse 70   Temp 98.7 °F (37.1 °C) (Temporal)   Resp 15   Ht 5' 6\" (1.676 m)   Wt 129 lb 12.8 oz (58.9 kg)   LMP 02/09/2022 (Exact Date)   SpO2 99%   BMI 20.95 kg/m²   GENERAL: well developed, well nourished,in no apparent distress  SKIN: small pinpoint lesion to medial left ankle.  HEENT: atraumatic, normocephalic,ears and throat are clear  NECK: supple,no adenopathy,no bruits  LUNGS: clear to auscultation  CARDIO: RRR without murmur  EXTREMITIES: no cyanosis, clubbing or edema    ASSESSMENT AND PLAN:     1. Need for vaccination  Tdap ordered and given  - TdaP (Boostrix) Vaccine (> 7 Y)    2. Laceration of left ankle, initial encounter  Tdap ordered and given.  Wound healing appropriately.   - TdaP (Boostrix) Vaccine (> 7 Y)      The patient indicates understanding of these issues and agrees to the plan.      Malissa Da Silva, APRN  6/3/2025           [1]   Current Outpatient Medications   Medication Sig Dispense Refill    estradiol 0.1 MG/GM Vaginal Cream Place 2 g vaginally daily. 3 time a week as needed 42.3 g 0    Lactobacillus (PROBIOTIC ACIDOPHILUS OR) Take by mouth.      D-MANNOSE OR Take by mouth.      Cranberry 450 MG Oral Tab Take by mouth As Directed.     [2]   Past Medical History:   Anxiety    Arthritis    Atypical mole    Change in hair    Eosinophilic esophagitis    Hearing loss    Hemorrhoids    Migraines    Pain in  joints    Stress    Wears glasses    Weight loss   [3]   Social History  Socioeconomic History    Marital status:    Tobacco Use    Smoking status: Former    Smokeless tobacco: Former   Vaping Use    Vaping status: Never Used   Substance and Sexual Activity    Alcohol use: Not Currently     Alcohol/week: 0.0 standard drinks of alcohol    Drug use: Not Currently

## 2025-06-13 DIAGNOSIS — N89.8 VAGINAL DRYNESS: ICD-10-CM

## 2025-06-14 RX ORDER — ESTRADIOL 0.1 MG/G
2 CREAM VAGINAL DAILY
Qty: 42.3 G | Refills: 0 | Status: SHIPPED | OUTPATIENT
Start: 2025-06-14

## 2025-06-14 NOTE — TELEPHONE ENCOUNTER
LOV:6/3/25    LAST LAB:11-7-2024    LAST RX:  estradiol 0.1 MG/GM Vaginal Cream 42.3 g 0 4/11/2025 --   Sig:   Place 2 g vaginally daily. 3 time a week as needed         Next OV: No future appointments.       PROTOCOL  Gynecology Medication Protocol Failed 06/13/2025 07:14 PM   Protocol Details PASS--PENDING LAST PAP WNL--VIA MANUAL LOOKUP    Mammogram in past 12 months    Medication is active on med list

## 2025-08-11 ENCOUNTER — PATIENT MESSAGE (OUTPATIENT)
Dept: FAMILY MEDICINE CLINIC | Facility: CLINIC | Age: 55
End: 2025-08-11

## 2025-08-11 DIAGNOSIS — Z12.31 SCREENING MAMMOGRAM FOR BREAST CANCER: Primary | ICD-10-CM

## 2025-08-20 DIAGNOSIS — N89.8 VAGINAL DRYNESS: ICD-10-CM

## 2025-08-21 ENCOUNTER — HOSPITAL ENCOUNTER (OUTPATIENT)
Dept: MAMMOGRAPHY | Age: 55
Discharge: HOME | End: 2025-08-21
Attending: FAMILY MEDICINE

## 2025-08-21 DIAGNOSIS — Z12.31 SCREENING MAMMOGRAM FOR BREAST CANCER: ICD-10-CM

## 2025-08-21 PROCEDURE — 77067 SCR MAMMO BI INCL CAD: CPT | Performed by: FAMILY MEDICINE

## 2025-08-21 PROCEDURE — 77063 BREAST TOMOSYNTHESIS BI: CPT | Performed by: FAMILY MEDICINE

## 2025-08-21 RX ORDER — ESTRADIOL 0.1 MG/G
2 CREAM VAGINAL DAILY
Qty: 42.3 G | Refills: 0 | Status: SHIPPED | OUTPATIENT
Start: 2025-08-21

## (undated) NOTE — LETTER
10/08/21        Stan Alvarenga  [address]  [facility] [place] [address]      Dear Peola Canavan,    [facility] records indicate that you have outstanding lab work and or testing that was ordered for you and has not yet been completed:  Orders Placed This Encounter        M